# Patient Record
Sex: FEMALE | Race: WHITE | NOT HISPANIC OR LATINO | URBAN - METROPOLITAN AREA
[De-identification: names, ages, dates, MRNs, and addresses within clinical notes are randomized per-mention and may not be internally consistent; named-entity substitution may affect disease eponyms.]

---

## 2017-04-18 ENCOUNTER — INPATIENT (INPATIENT)
Facility: HOSPITAL | Age: 77
LOS: 1 days | Discharge: HOME HEALTH | DRG: 580 | End: 2017-04-20
Attending: STUDENT IN AN ORGANIZED HEALTH CARE EDUCATION/TRAINING PROGRAM | Admitting: STUDENT IN AN ORGANIZED HEALTH CARE EDUCATION/TRAINING PROGRAM
Payer: COMMERCIAL

## 2017-04-18 VITALS
RESPIRATION RATE: 18 BRPM | TEMPERATURE: 98 F | HEART RATE: 82 BPM | DIASTOLIC BLOOD PRESSURE: 66 MMHG | SYSTOLIC BLOOD PRESSURE: 119 MMHG | OXYGEN SATURATION: 96 %

## 2017-04-18 DIAGNOSIS — E87.1 HYPO-OSMOLALITY AND HYPONATREMIA: ICD-10-CM

## 2017-04-18 DIAGNOSIS — I10 ESSENTIAL (PRIMARY) HYPERTENSION: ICD-10-CM

## 2017-04-18 DIAGNOSIS — I25.10 ATHEROSCLEROTIC HEART DISEASE OF NATIVE CORONARY ARTERY WITHOUT ANGINA PECTORIS: ICD-10-CM

## 2017-04-18 DIAGNOSIS — D64.9 ANEMIA, UNSPECIFIED: ICD-10-CM

## 2017-04-18 DIAGNOSIS — Z90.49 ACQUIRED ABSENCE OF OTHER SPECIFIED PARTS OF DIGESTIVE TRACT: Chronic | ICD-10-CM

## 2017-04-18 DIAGNOSIS — Z90.710 ACQUIRED ABSENCE OF BOTH CERVIX AND UTERUS: Chronic | ICD-10-CM

## 2017-04-18 DIAGNOSIS — C80.1 MALIGNANT (PRIMARY) NEOPLASM, UNSPECIFIED: ICD-10-CM

## 2017-04-18 DIAGNOSIS — R63.8 OTHER SYMPTOMS AND SIGNS CONCERNING FOOD AND FLUID INTAKE: ICD-10-CM

## 2017-04-18 DIAGNOSIS — Z41.8 ENCOUNTER FOR OTHER PROCEDURES FOR PURPOSES OTHER THAN REMEDYING HEALTH STATE: ICD-10-CM

## 2017-04-18 LAB
ALBUMIN SERPL ELPH-MCNC: 2.7 G/DL — LOW (ref 3.4–5)
ALP SERPL-CCNC: 58 U/L — SIGNIFICANT CHANGE UP (ref 40–120)
ALT FLD-CCNC: 20 U/L — SIGNIFICANT CHANGE UP (ref 12–42)
ANION GAP SERPL CALC-SCNC: 6 MMOL/L — LOW (ref 9–16)
APTT BLD: 25.3 SEC — LOW (ref 27.5–37.4)
AST SERPL-CCNC: 52 U/L — HIGH (ref 15–37)
BASOPHILS NFR BLD AUTO: 0.4 % — SIGNIFICANT CHANGE UP (ref 0–2)
BILIRUB SERPL-MCNC: 0.4 MG/DL — SIGNIFICANT CHANGE UP (ref 0.2–1.2)
BLD GP AB SCN SERPL QL: NEGATIVE — SIGNIFICANT CHANGE UP
BUN SERPL-MCNC: 18 MG/DL — SIGNIFICANT CHANGE UP (ref 7–23)
CALCIUM SERPL-MCNC: 9 MG/DL — SIGNIFICANT CHANGE UP (ref 8.5–10.5)
CHLORIDE SERPL-SCNC: 105 MMOL/L — SIGNIFICANT CHANGE UP (ref 96–108)
CO2 SERPL-SCNC: 23 MMOL/L — SIGNIFICANT CHANGE UP (ref 22–31)
CREAT SERPL-MCNC: 0.66 MG/DL — SIGNIFICANT CHANGE UP (ref 0.5–1.3)
EOSINOPHIL NFR BLD AUTO: 0.7 % — SIGNIFICANT CHANGE UP (ref 0–6)
GLUCOSE SERPL-MCNC: 94 MG/DL — SIGNIFICANT CHANGE UP (ref 70–99)
HCT VFR BLD CALC: 29.5 % — LOW (ref 34.5–45)
HGB BLD-MCNC: 9 G/DL — LOW (ref 11.5–15.5)
INR BLD: 1.17 — HIGH (ref 0.88–1.16)
LYMPHOCYTES # BLD AUTO: 11.9 % — LOW (ref 13–44)
MCHC RBC-ENTMCNC: 23.3 PG — LOW (ref 27–34)
MCHC RBC-ENTMCNC: 30.5 G/DL — LOW (ref 32–36)
MCV RBC AUTO: 76.2 FL — LOW (ref 80–100)
MONOCYTES NFR BLD AUTO: 8.7 % — SIGNIFICANT CHANGE UP (ref 2–14)
NEUTROPHILS NFR BLD AUTO: 78.3 % — HIGH (ref 43–77)
PLATELET # BLD AUTO: 294 K/UL — SIGNIFICANT CHANGE UP (ref 150–400)
POTASSIUM SERPL-MCNC: 5.2 MMOL/L — SIGNIFICANT CHANGE UP (ref 3.5–5.3)
POTASSIUM SERPL-SCNC: 5.2 MMOL/L — SIGNIFICANT CHANGE UP (ref 3.5–5.3)
PROT SERPL-MCNC: 8 G/DL — SIGNIFICANT CHANGE UP (ref 6.4–8.2)
PROTHROM AB SERPL-ACNC: 13 SEC — HIGH (ref 9.8–12.7)
RBC # BLD: 3.87 M/UL — SIGNIFICANT CHANGE UP (ref 3.8–5.2)
RH IG SCN BLD-IMP: POSITIVE — SIGNIFICANT CHANGE UP
SODIUM SERPL-SCNC: 134 MMOL/L — LOW (ref 135–145)
WBC # BLD: 7.5 K/UL — SIGNIFICANT CHANGE UP (ref 3.8–10.5)
WBC # FLD AUTO: 7.5 K/UL — SIGNIFICANT CHANGE UP (ref 3.8–10.5)

## 2017-04-18 PROCEDURE — 93010 ELECTROCARDIOGRAM REPORT: CPT

## 2017-04-18 PROCEDURE — 99285 EMERGENCY DEPT VISIT HI MDM: CPT | Mod: 25

## 2017-04-18 PROCEDURE — 71010: CPT | Mod: 26

## 2017-04-18 PROCEDURE — 99223 1ST HOSP IP/OBS HIGH 75: CPT | Mod: GC

## 2017-04-18 RX ORDER — SIMVASTATIN 20 MG/1
20 TABLET, FILM COATED ORAL AT BEDTIME
Qty: 0 | Refills: 0 | Status: DISCONTINUED | OUTPATIENT
Start: 2017-04-18 | End: 2017-04-20

## 2017-04-18 RX ORDER — METOPROLOL TARTRATE 50 MG
25 TABLET ORAL DAILY
Qty: 0 | Refills: 0 | Status: DISCONTINUED | OUTPATIENT
Start: 2017-04-18 | End: 2017-04-20

## 2017-04-18 RX ORDER — HEPARIN SODIUM 5000 [USP'U]/ML
5000 INJECTION INTRAVENOUS; SUBCUTANEOUS EVERY 12 HOURS
Qty: 0 | Refills: 0 | Status: DISCONTINUED | OUTPATIENT
Start: 2017-04-18 | End: 2017-04-19

## 2017-04-18 RX ORDER — ANASTROZOLE 1 MG/1
1 TABLET ORAL DAILY
Qty: 0 | Refills: 0 | Status: DISCONTINUED | OUTPATIENT
Start: 2017-04-18 | End: 2017-04-20

## 2017-04-18 RX ORDER — TRAMADOL HYDROCHLORIDE 50 MG/1
0 TABLET ORAL
Qty: 0 | Refills: 0 | COMMUNITY

## 2017-04-18 RX ORDER — ASPIRIN/CALCIUM CARB/MAGNESIUM 324 MG
81 TABLET ORAL DAILY
Qty: 0 | Refills: 0 | Status: DISCONTINUED | OUTPATIENT
Start: 2017-04-18 | End: 2017-04-18

## 2017-04-18 RX ADMIN — SIMVASTATIN 20 MILLIGRAM(S): 20 TABLET, FILM COATED ORAL at 23:27

## 2017-04-18 NOTE — H&P ADULT - HISTORY OF PRESENT ILLNESS
77 yo F with a PMH of cervical cancer (what year, what treatment, what doctor, where is follow up) presents for treatment of Stage 4 left breast adenocarcinoma with metastatic spread to the bone. Pt is a pt of ______ and usually has her therapy done at ______, however the pt is here today because_______.     fungating mass surgery says nothing to do possibly debulking at a later time 75 yo F with a PMH of cervical cancer (1988, what Hysterectomy, [facility], without follow up), HTN, CAD, of Stage 4 left breast adenocarcinoma with metastatic spread to the bone presents with bleeding breast mass. The mass bleeds every day for the past 4 years. It has gotten progressivey worse with increased enhancement in the last three months. Today, the patient removed a dressing from the mass and the bleeding was significant the the patient described the bleeding as hemorrhaging". Pt is a pt of ______ and usually has her therapy done at ______, however the pt is here today because_______.     fungating mass surgery says nothing to do possibly debulking at a later time     Dr. Yanez Radiation Oncologist 77 yo F with a PMH of cervical cancer (1988, what Hysterectomy, [facility], without follow up), HTN, CAD, of Stage 4 left breast adenocarcinoma with metastatic spread to the bone presents with bleeding breast mass. The mass bleeds every day for the past 4 years. It has gotten progressivey worse with increased growth of mass in the last three months. Today, the patient removed a dressing from the mass and the bleeding was significant the the patient described the bleeding as hemorrhaging". Pt hs hard time quantifying it but staes bleeding is about a half a cut.  [facility] for 5 days with mets to the back but had insurance complications. Pt kept the cancer a secret from her family until recently due to other stressors in her life      Pt denies headache, chest pain, shorness of breath, cough, abdominal pain, N/V/D/, dysuria, hematuria, hematochezia, melena. Admits to constupation last BM 3 days ago  Was scheduled for radiosurgery evaluation this afternoon with Dr. Pablo, with plans to receive medi-port on Thursday  fungating mass surgery says nothing to do possibly debulking at a later time     Dr. Yanez Radiation Oncologist    Surg- Dr. De Santiago rec heme/onc looks as is eroding into chest wall surg is on call team 1 if starts bledding [id number] 75 yo F with a PMH of cervical cancer (1988, what Hysterectomy, [facility], without follow up), HTN, CAD, of Stage 4 left breast adenocarcinoma with metastatic spread to the bone presents with bleeding breast mass. The mass has bleed every day for the past few years, with penetration through the skin approximately 2 years ago. It has gotten progressively worse with increased growth of mass in the last three months. Today, the patient removed a dressing from the mass and the bleeding was significant. Patient described the bleeding as "hemorrhaging". Pt has hard time quantifying it but states bleeding is about a half a cup.      Pt was hospitalized at [facility] for 5 days for workup  with mets to the back but had insurance complications. Pt kept the cancer a secret from her family until recently due to other stressors in her life      Pt denies headache, chest pain, shorness of breath, cough, abdominal pain, N/V/D/, dysuria, hematuria, hematochezia, melena. Admits to constupation last BM 3 days ago  Was scheduled for radiosurgery evaluation this afternoon with Dr. Pablo, with plans to receive medi-port on Thursday  fungating mass surgery says nothing to do possibly debulking at a later time     Dr. Yanez Radiation Oncologist    Surg- Dr. De Santiago rec heme/onc looks as is eroding into chest wall surg is on call team 1 if starts bledding [phone] 75 yo F with a PMH of cervical cancer (1988, what Hysterectomy, [facility], without follow up), HTN, CAD, of Stage 4 left breast adenocarcinoma with metastatic spread to the bone presents with bleeding breast mass. The mass has bleed every day for the past few years, with penetration through the skin approximately 2 years ago. It has gotten progressively worse with increased growth of mass in the last three months. Today, the patient removed a dressing from the mass and the bleeding was significant. Patient described the bleeding as "hemorrhaging". Pt has hard time quantifying it but states bleeding is about a half a cup.      Pt was hospitalized at [facility] 25th for 5 days for workup  with mets to the back but had insurance complications. Pt kept the cancer a secret from her family until recently due to other stressors in her life. While at [facility], the patient's oncologist was Dr. Velazquez ([phone]). Unfortunately, [facility] is not in the pts insurance coverage and pt had to be referred outside of network. Pt saw Dr. Keys ([phone]) on Monday for an initial evaluation. Was scheduled for radiosurgery evaluation this afternoon with Dr. Garzon (Rad-Onc [phone]) , with plans to receive medi-port on Thursday, however plans were placed on hold as pt presented today.     Pt denies headache, chest pain, shortness of breath, cough, abdominal pain, N/V/D/, dysuria, hematuria, hematochezia, melena. Admits to constipation last BM 3 days ago    Was scheduled for radiosurgery evaluation this afternoon with Dr. Garzon (Rad-Onc [phone]), with plans to receive medi-port on Thursday, however plans were placed on hold as pt presented today.     Surgery was consulted but no acute intervention needed at this time as bleeding has subsided. 77 yo F with a PMH of cervical cancer (1988, what Hysterectomy, [facility], without follow up),  CAD, of Stage 4 left breast adenocarcinoma with metastatic spread to the bone presents with bleeding breast mass. The mass has bleed every day for the past few years, with penetration through the skin approximately 2 years ago. It has gotten progressively worse with increased growth of mass in the last three months. Today, the patient removed a dressing from the mass and the bleeding was significant. Patient described the bleeding as "hemorrhaging". Pt has hard time quantifying it but states bleeding is about a half a cup.       Pt was hospitalized at [facility] 25[facility] for 5 days for workup  with mets to the back but had insurance complications. Pt kept the cancer a secret from her family until recently due to other stressors in her life. While at [facility], the patient's oncologist was Dr. Velazquez ([phone]). Unfortunately, [facility] is not in the pts insurance coverage and pt had to be referred outside of network. Pt saw Dr. Keys ([phone]) on Monday for an initial evaluation. Was scheduled for radiosurgery evaluation this afternoon with Dr. Garzon (Rad-Onc [phone]) , with plans to receive medi-port on Thursday, however plans were placed on hold as pt presented today.     Pt denies headache, chest pain, shortness of breath, cough, abdominal pain, N/V/D/, dysuria, hematuria, hematochezia, melena. Admits to constipation last BM 3 days ago    Was scheduled for radiosurgery evaluation this afternoon with Dr. Garzon (Rad-Onc [phone]), with plans to receive medi-port on Thursday, however plans were placed on hold as pt presented today.     Surgery was consulted but no acute intervention needed at this time as bleeding has subsided.

## 2017-04-18 NOTE — ED ADULT NURSE NOTE - OBJECTIVE STATEMENT
76y F, A&ox3, came in via EMS after noting bleeding from left breast tumor. Noted dry blood to abdomen, left lower leg, and left hand. Bleeding controlled on arrival. Noted wound to left lower breast, minimal bleeding at the time. Pt states had to stop aspirin x5days ago for radiation evaluation treatment. Denies sob, lightheadedness, dizziness, no cp, no n/v. Denies fever nor chills. +pulses bilateral. Guaze on wound. PT states previously happened before but recalls more blood lost today, cannot recall how much blood. Will continue to monitor. 76y F, A&ox3, came in via EMS after noting bleeding from left breast tumor. Hx of left breast ca with metastasis to bone. Noted dry blood to abdomen, left lower leg, and left hand. Bleeding controlled on arrival. Noted ulcerative tumor to left lower breast, minimal bleeding at the time. Pt states had to stop aspirin x5days ago for radiation evaluation treatment. Denies sob, lightheadedness, dizziness, no cp, no n/v. Denies fever nor chills. +pulses bilateral. Guaze on wound. PT states previously happened before but recalls more blood lost today, cannot recall how much blood. Will continue to monitor.

## 2017-04-18 NOTE — ED PROVIDER NOTE - MEDICAL DECISION MAKING DETAILS
77yo female with metastatic adenocarcinoma of L breast with mets to spine, bone, lung? liver? presenting with persistently worsening bleeding of mass. Pt well appearing with nl VS H/H 9/29 took ASA 5 days prior. Surgery at bedside, no active bleeding, only oozing from mass. Will attempt to ligate/ cauterize bleeding area. Likely admit for obs vs further treatment. Daughter is requesting that pt be treated while inpatient.

## 2017-04-18 NOTE — ED PROVIDER NOTE - OBJECTIVE STATEMENT
75 yo female with h/o cervical ca presenting with L breast adenocarcinoma that is large and ulcerated began bleeding this morning, pt states that it was a "heavy bleed," that resolved prior to arrival in ED. Pt states that she is generally weak however denies lightheadedness, dizziness, syncope. Last took ASA 3 days ago. 75 yo female with h/o cervical ca presenting with L breast adenocarcinoma stage 4 with mets to bone- that is large and ulcerated began bleeding this morning, pt states that it was a "heavy bleed," that resolved prior to arrival in ED. Pt states that she is generally weak however denies lightheadedness, dizziness, syncope. Last took ASA 5 days ago.    Pt recently admitted to [facility], not in network for pt insurance, as per daughter would like to receive treatment here. Was scheduled for radiosurgery this afternoon with Dr. Pablo, is going to receive medi-port on Thursday. Does not currently have an oncologist. Was being seen by Dr. Judith Velazquez at [facility] [phone]. Has received approx 3 prior transfusions secondary to bleeding mass.

## 2017-04-18 NOTE — ED ADULT NURSE REASSESSMENT NOTE - NS ED NURSE REASSESS COMMENT FT1
Pt resting comfortably. Pending surgery consult. Labs reviewed. No active bleeding. Will continue to monitor.

## 2017-04-18 NOTE — H&P ADULT - PROBLEM SELECTOR PLAN 5
HSQ for now. Should be on lovenox as pt has malignancy however uncertain if pt will need procedure in near future. Consider switch to lovenox as plan becomes more concrete

## 2017-04-18 NOTE — H&P ADULT - ASSESSMENT
75 yo with PMH of cervical cancer, HTN, CAD,Stage 4 left breast adenocarcinoma with metastatic spread to the bone presents with bleeding breast mass. 77 yo with PMH of cervical cancer, CAD,Stage 4 left breast adenocarcinoma with metastatic spread to the bone presents with bleeding breast mass.

## 2017-04-18 NOTE — H&P ADULT - NSHPSOCIALHISTORY_GEN_ALL_CORE
Smoking-  ETHO-  Elicit-   Work-  Exposure-  Family life-   Sexual History- Smoking- smoked sparingly in 20s but could not quantify  ETHO- glass of wine sparingly   Elicit- none   Work- retired    Exposure- none   Family life- lives in [place] building on [address] floor by herself  Ambulation- without assistance   Sexual History- not sexually active  no hx of STD

## 2017-04-18 NOTE — CONSULT NOTE ADULT - ASSESSMENT
77 yo female with metastatic breast CA with bleeding fungating mass (now bleeding stopped)    [facility]  Dr. De Santiago to evaluate mass and imaging tomorrow to evaluate resectability  If no head CT please confirm no brain mets  Surgery team available if begins to rebleed contact [phone]  Discussed with Dr. De Santiago 75 yo female with metastatic breast CA with bleeding fungating left breat mass (now bleeding stopped), possible right breast mass. She has bulky lymphadenopathy on the left and suspicion for extensive spinal mets/epidural soft tissue involvement and pulmonary disease, possible other areas of bony disease, and other areas of metastatic disease.    Plan:  Oncology input for systemic therapy. port placement done today.  we will review PET CT as well.  Radiation consultation for possible spine radiation  Continue pads to wound for mild oozing. Hbg 9. We will review films, if this is not extending into bone (it looks like it may not be), as it feels mobile, once the spine is treated, we can consider excision/toilet mastectomy for wound control after any more urgent needs are taken care of. We would have plastic surgery involved as needed for wound coverage and possible thoracic in case of any surprises with chest wall/bone involvement particularly in 8-9-10th intercostal spaces where there is likely muscle involvement. F/u outpatient with me asap.

## 2017-04-18 NOTE — ED PROVIDER NOTE - CARE PLAN
Principal Discharge DX:	Breast mass Principal Discharge DX:	Breast mass  Secondary Diagnosis:	Anemia, unspecified type

## 2017-04-18 NOTE — H&P ADULT - PROBLEM SELECTOR PLAN 4
FEN  No IVF  replete lytes prn   Cardiac Diet mentating well. unknown baseline. could be SIADH in setting of malignancy.   -Will sent serum osm and urine sodium for workup.

## 2017-04-18 NOTE — H&P ADULT - PROBLEM SELECTOR PLAN 1
[facility] consult to evaluate prognoses  Dr. De Santiago to evaluate mass and imaging tomorrow to evaluate resectability  If no head CT please confirm no brain mets  Surgery team available if begins to rebleed contact [phone]  Discussed with Dr. De Santiago  - pt on anastrozole Pt with large exophytic mass on left breast as described in PE. Pt treated by Dr. Velazquez at [facility] as inpatient, had initial evaluation with Dr. Keys on Monday, placed on anastrozole by Dr. Keys. Plans to see Dr. De La Torre today and chemiport placement on thursady, however pt presents to [facility] with bleeding. Bleeding now stable.   -Surgery consulted nothing acute to do at this time. Dr. De Santiago to evaluate mass and imaging tomorrow to evaluate resectability. They would like a head CT if one has not been done yet to investigate mets. Pt has imagine with her that is left in the chart that must be taken down to radiology for upload into PACS. Surgery team available if begins to rebleed and requires immediate intervention [phone]  - c/w anastrozole  - obtain collateral from Dr. Keys/ Dr. Velazquez (contact info in HPI) Pt with large exophytic mass on left breast as described in PE. Pt treated by Dr. Velazquez at [facility] as inpatient, had initial evaluation with Dr. Keys on Monday, placed on anastrozole by Dr. Keys. Plans to see Dr. De La Torre today and chemiport placement on thursady, however pt presents to [facility] with bleeding. Bleeding now stable.   -Surgery consulted nothing acute to do at this time. Dr. De Santiago to evaluate mass and imaging tomorrow to evaluate resectability. They would like a head CT if one has not been done yet to investigate mets. Pt has imagine with her that is left in the chart that must be taken down to radiology for upload into PACS. Surgery team available if begins to rebleed and requires immediate intervention [phone]  - c/w anastrozole  - obtain collateral from Dr. Keys/ Dr. Velazquez (contact info in HPI)  - wound care consult placed

## 2017-04-18 NOTE — H&P ADULT - NSHPPHYSICALEXAM_GEN_ALL_CORE
PHYSICAL EXAM:    Constitutional: WDWN, NAD,   Eyes: PERRL, EOMI, sclera non-icteric  Neck: supple, no masses, no JVD  Respiratory: CTA b/l, good air entry b/l, no wheezing, rhonchi, rales, with normal respiratory effort and no intercostal retractions  Cardiovascular: RRR, normal S1S2, no M/R/G  Breast:  Gastrointestinal: soft, NTND, no masses palpable, BS normal in all four quadrants, no HSM  Extremities: WWM, no c/c/e  Neurological: AAOx3  Skin: Normal temperature PHYSICAL EXAM:    Constitutional: WDWN, NAD,   Eyes: PERRL, EOMI, sclera non-icteric  Neck: supple, no masses, no JVD  Respiratory: CTA b/l, good air entry b/l, no wheezing, rhonchi, rales, with normal respiratory effort and no intercostal retractions  Cardiovascular: RRR, normal S1S2, no M/R/G  Breast:  Left breast mass at 5 o ' clock [id number] fungating mass with contiguous granulation tissue throughout, active bleeding/wheezing appreciated with soiled dressing, firm base with reddish color, potent foul odor   Gastrointestinal: soft, NTND, no masses palpable, BS normal in all four quadrants, no HSM  Extremities: WWM, no c/c/e  Neurological: AAOx3  Skin: Normal temperature PHYSICAL EXAM:    Constitutional: WDWN, NAD,   Eyes: PERRL, EOMI, sclera non-icteric  Neck: supple, no masses, no JVD  Respiratory: CTA b/l, good air entry b/l, no wheezing, rhonchi, rales, with normal respiratory effort and no intercostal retractions  Cardiovascular: RRR, normal S1S2, no M/R/G  Breast:  Left breast mass at 5 o ' clock [id number] fungating mass with contiguous granulation tissue throughout, active bleeding/wheezing appreciated with soiled dressing, firm base with reddish color, potent foul odor, exophytic   Gastrointestinal: soft, NTND, no masses palpable, BS normal in all four quadrants, no HSM  Extremities: WWM, no c/c/e  Neurological: AAOx3  Skin: Normal temperature

## 2017-04-18 NOTE — H&P ADULT - PROBLEM SELECTOR PLAN 2
as per patient, started while at [facility] for suspected old infarct  -c/w statin, asa, beta blocker

## 2017-04-18 NOTE — H&P ADULT - PROBLEM SELECTOR PLAN 3
HSQ likely secondary to persistent slow ooz from lesion site as well as anemia of chronic disease. hemoglobin 9 currently unknown baseline.   -obtain collateral for baseline hbg in am   -f/u cbc likely secondary to persistent slow ooz from lesion site as well as anemia of chronic disease. hemoglobin 9 currently unknown baseline. Pt HD stable.   -obtain collateral for baseline hbg in am   -f/u cbc

## 2017-04-18 NOTE — ED ADULT TRIAGE NOTE - CHIEF COMPLAINT QUOTE
Patient brought in by EMS for evaluation of bleeding from left breast tumor. Says stopped anticoagulants 5 days ago to prep for a procedure. Bleeding now controlled with pressure.

## 2017-04-18 NOTE — ED PROVIDER NOTE - SKIN LOCATION #1
large 4cm x 4cm ulcerated mass of L breast with exposed granulation tissue with mild active oozing. No identifiable bleeding vessel, foul smell

## 2017-04-19 ENCOUNTER — TRANSCRIPTION ENCOUNTER (OUTPATIENT)
Age: 77
End: 2017-04-19

## 2017-04-19 LAB
ANION GAP SERPL CALC-SCNC: 8 MMOL/L — LOW (ref 9–16)
BUN SERPL-MCNC: 15 MG/DL — SIGNIFICANT CHANGE UP (ref 7–23)
CALCIUM SERPL-MCNC: 9.2 MG/DL — SIGNIFICANT CHANGE UP (ref 8.5–10.5)
CHLORIDE SERPL-SCNC: 105 MMOL/L — SIGNIFICANT CHANGE UP (ref 96–108)
CO2 SERPL-SCNC: 25 MMOL/L — SIGNIFICANT CHANGE UP (ref 22–31)
CREAT SERPL-MCNC: 0.65 MG/DL — SIGNIFICANT CHANGE UP (ref 0.5–1.3)
GLUCOSE SERPL-MCNC: 93 MG/DL — SIGNIFICANT CHANGE UP (ref 70–99)
HCT VFR BLD CALC: 29.4 % — LOW (ref 34.5–45)
HGB BLD-MCNC: 9 G/DL — LOW (ref 11.5–15.5)
MAGNESIUM SERPL-MCNC: 2.1 MG/DL — SIGNIFICANT CHANGE UP (ref 1.6–2.4)
MCHC RBC-ENTMCNC: 23.3 PG — LOW (ref 27–34)
MCHC RBC-ENTMCNC: 30.6 G/DL — LOW (ref 32–36)
MCV RBC AUTO: 76.2 FL — LOW (ref 80–100)
OSMOLALITY SERPL: 290 MOSM/KG — SIGNIFICANT CHANGE UP (ref 280–301)
PHOSPHATE SERPL-MCNC: 3.9 MG/DL — SIGNIFICANT CHANGE UP (ref 2.5–4.5)
PLATELET # BLD AUTO: 310 K/UL — SIGNIFICANT CHANGE UP (ref 150–400)
POTASSIUM SERPL-MCNC: 4.1 MMOL/L — SIGNIFICANT CHANGE UP (ref 3.5–5.3)
POTASSIUM SERPL-SCNC: 4.1 MMOL/L — SIGNIFICANT CHANGE UP (ref 3.5–5.3)
RBC # BLD: 3.86 M/UL — SIGNIFICANT CHANGE UP (ref 3.8–5.2)
SODIUM SERPL-SCNC: 138 MMOL/L — SIGNIFICANT CHANGE UP (ref 135–145)
SODIUM UR-SCNC: 100 MMOL/L — SIGNIFICANT CHANGE UP
WBC # BLD: 5.3 K/UL — SIGNIFICANT CHANGE UP (ref 3.8–10.5)
WBC # FLD AUTO: 5.3 K/UL — SIGNIFICANT CHANGE UP (ref 3.8–10.5)

## 2017-04-19 PROCEDURE — 99222 1ST HOSP IP/OBS MODERATE 55: CPT

## 2017-04-19 PROCEDURE — 76937 US GUIDE VASCULAR ACCESS: CPT | Mod: 26

## 2017-04-19 PROCEDURE — 77001 FLUOROGUIDE FOR VEIN DEVICE: CPT | Mod: 26

## 2017-04-19 PROCEDURE — 99233 SBSQ HOSP IP/OBS HIGH 50: CPT | Mod: GC

## 2017-04-19 PROCEDURE — 36561 INSERT TUNNELED CV CATH: CPT

## 2017-04-19 RX ORDER — ACETAMINOPHEN 500 MG
650 TABLET ORAL EVERY 6 HOURS
Qty: 0 | Refills: 0 | Status: DISCONTINUED | OUTPATIENT
Start: 2017-04-19 | End: 2017-04-20

## 2017-04-19 RX ORDER — HEPARIN SODIUM 5000 [USP'U]/ML
5000 INJECTION INTRAVENOUS; SUBCUTANEOUS EVERY 8 HOURS
Qty: 0 | Refills: 0 | Status: DISCONTINUED | OUTPATIENT
Start: 2017-04-19 | End: 2017-04-19

## 2017-04-19 RX ORDER — ENOXAPARIN SODIUM 100 MG/ML
40 INJECTION SUBCUTANEOUS EVERY 24 HOURS
Qty: 0 | Refills: 0 | Status: DISCONTINUED | OUTPATIENT
Start: 2017-04-19 | End: 2017-04-20

## 2017-04-19 RX ADMIN — HEPARIN SODIUM 5000 UNIT(S): 5000 INJECTION INTRAVENOUS; SUBCUTANEOUS at 06:18

## 2017-04-19 RX ADMIN — Medication 25 MILLIGRAM(S): at 06:18

## 2017-04-19 RX ADMIN — Medication 650 MILLIGRAM(S): at 23:19

## 2017-04-19 RX ADMIN — SIMVASTATIN 20 MILLIGRAM(S): 20 TABLET, FILM COATED ORAL at 22:49

## 2017-04-19 RX ADMIN — ANASTROZOLE 1 MILLIGRAM(S): 1 TABLET ORAL at 12:36

## 2017-04-19 RX ADMIN — ENOXAPARIN SODIUM 40 MILLIGRAM(S): 100 INJECTION SUBCUTANEOUS at 19:46

## 2017-04-19 NOTE — DISCHARGE NOTE ADULT - PROVIDER TOKENS
FREE:[LAST:[sim],PHONE:[(   )    -],FAX:[(   )    -],ADDRESS:[Dr. Ramsey Keys MD  Oncologist  Counts include 234 beds at the [facility]  [phone]]],TOKEN:'[id number]',TOKEN:'[id number]'

## 2017-04-19 NOTE — DISCHARGE NOTE ADULT - NS AS ACTIVITY OBS
Bathing allowed/Walking-Indoors allowed/Stairs allowed/Sex allowed/Walking-Outdoors allowed/Please continue to increase your activity as tolerated/Showering allowed

## 2017-04-19 NOTE — PROGRESS NOTE ADULT - SUBJECTIVE AND OBJECTIVE BOX
SUBJECTIVE: Pt seen and examined at bedside. No overnight events. No f/c/n/v    Vital Signs Last 24 Hrs  T(C): 36.6, Max: 36.8 (04-18 @ 12:29)  T(F): 97.9, Max: 98.3 (04-18 @ 12:29)  HR: 78 (78 - 88)  BP: 162/79 (119/66 - 162/79)  BP(mean): --  RR: 16 (16 - 18)  SpO2: 97% (95% - 98%)    PHYSICAL EXAM    Gen: NAD  CV RRR  Pulm: Regular non-labored respirations  Breast: Left breast with bulky mass with necrotic surface, nontender, no erythema or obvious purulence but fibrinous/necrotic tissue throughout. Left axillary lymphadenopathy noted. Right breast soft, no obvious mass, nontender  Abd: Soft, nontender, nondistended  Ext: P        LABS:                        9.0    5.3   )-----------( 310      ( 19 Apr 2017 08:04 )             29.4     04-19    138  |  105  |  15  ----------------------------<  93  4.1   |  25  |  0.65    Ca    9.2      19 Apr 2017 08:04  Phos  3.9     04-19  Mg     2.1     04-19    TPro  8.0  /  Alb  2.7<L>  /  TBili  0.4  /  DBili  x   /  AST  52<H>  /  ALT  20  /  AlkPhos  58  04-18    PT/INR - ( 18 Apr 2017 13:12 )   PT: 13.0 sec;   INR: 1.17          PTT - ( 18 Apr 2017 13:12 )  PTT:25.3 sec      ASSESSMENT AND PLAN  75 yo female with metastatic breast CA with bleeding fungating mass. Bleeding has since stopped, pt H/H stable. AFVSS  -Will f/u Heme-Onc consult  -Will need images from [facility] uploaded to synapse to assess  -Dr. De Santiago to evaluate mass and imaging to evaluate resectability  -D/W Dr. De Santiago

## 2017-04-19 NOTE — DISCHARGE NOTE ADULT - HOSPITAL COURSE
Patient is a 75 yo F with a PMH of cervical cancer (1988, what Hysterectomy, [facility], without follow up),  CAD, of Stage 4 left breast adenocarcinoma with metastatic spread to the bone presents with bleeding breast mass. The mass has bleed every day for the past few years, with penetration through the skin approximately 2 years ago. It has gotten progressively worse with increased growth of mass in the last three months. Patient presented with bleeding from the mass on 4/18     Pt was hospitalized at [address] for 5 days for workup  with mets to the back but had insurance complications. Pt kept the cancer a secret from her family until recently due to other stressors in her life. While at [facility], the patient's oncologist was Dr. Velazquez ([phone]). Unfortunately, [facility] is not in the pts insurance coverage and pt had to be referred outside of network. Pt saw Dr. Keys ([phone]) on Monday for an initial evaluation. Was scheduled for radiosurgery evaluation before presentation with Dr. Garzon (Rad-Onc [phone]) , with plans to receive medi-port on Thursday, however plans were placed on hold as pt presented prior.     Surgery consulted, no intervention at this time. Chemiport placed on 4/19 for chemotherapy with Dr. Keys. Patient seen by Dr. Frias on behalf of Dr. Garzon, and received radiation therapy to spine on 4/20. Patient will be discharged with close follow up by Dr. Keys, Dr Frias, and Surgery with Dr. De Santiago.

## 2017-04-19 NOTE — PROGRESS NOTE ADULT - ASSESSMENT
Patient is a 77 yo with PMH of cervical cancer, CAD, Stage 4 left breast adenocarcinoma with metastatic spread to the bone presents with bleeding breast mass, s/p chemoport placement now to be evaluated by radiation therapy

## 2017-04-19 NOTE — ADVANCED PRACTICE NURSE CONSULT - ASSESSMENT
History of Present Illness: 	  77 yo F with a PMH of cervical cancer (1988[facility]),  CAD, of Stage 4 left breast adenocarcinoma with metastatic spread to the bone presents with bleeding breast mass. Minimal bleeding noted at present but RN reports changing dressing twice today. Mass below left breast with minimal odor, pt denies pain, scattered necrotic tissue noted over mass.

## 2017-04-19 NOTE — ADVANCED PRACTICE NURSE CONSULT - RECOMMEDATIONS
Recommend silicone dressing over mass, leave in place x 5 days. Apply Aquacel Extra over silicone dressing, cover with ABD pads and secure with Spandage dressing. Spoke with ZANDRA Puga and house staff.

## 2017-04-19 NOTE — DISCHARGE NOTE ADULT - ADDITIONAL INSTRUCTIONS
Please follow up with Dr. Keys on April 26th at 11am  Please follow up with Dr. Nemesio Yanez on April 21st at 12pm Please follow up with Dr. Keys on April 26th at 11am  Please follow up with Dr. Nemesio Yanez on April 21st at 12pm  Please follow up with Dr. De Santiago April 25th at 11:30

## 2017-04-19 NOTE — DISCHARGE NOTE ADULT - PATIENT PORTAL LINK FT
“You can access the [facility] Patient Portal, offered by [facility], by registering with the following website: [email or web address]”

## 2017-04-19 NOTE — PROGRESS NOTE ADULT - PROBLEM SELECTOR PLAN 1
Pt with large exophytic mass on left breast as described in PE. Pt treated by Dr. Velazquez at [facility] as inpatient, had initial evaluation with Dr. Keys on Monday, placed on anastrozole by Dr. Keys. Plans to see Dr. De La Torre called will evaluate patient while inpatient.  -Surgery consulted nothing acute to do at this time. Dr. De Santiago following, recs appreciated. Surgery team available if begins to rebleed and requires immediate intervention [phone]  - c/w anastrozole  - Dr. Keys agrees to inpatient eval by Dr. Yanez, if no acute radiation planned will d/c with plans to start chemotherapy imminently with Dr. Keys  - wound care consulted, recommendations ordered Pt with large exophytic mass on left breast as described in PE. Pt treated by Dr. Velazquez at [facility] as inpatient, had initial evaluation with Dr. Keys on Monday, placed on anastrozole by Dr. Keys and referrals made to  rad onc ( Dr. De La Torre)  --he  will evaluate patient   port placed today as Dr Keys is planning systemic chemo in the near future  -Surgery consulted nothing acute to do at this time. Dr. De Santiago following, recs appreciated. Surgery team available if begins to rebleed and requires immediate intervention [phone]  - c/w anastrozole  -  eval by Dr. Yanez, if no acute radiation planned will d/c with plans to start chemotherapy imminently with Dr. Keys  - wound care consulted, recommendations ordered

## 2017-04-19 NOTE — DISCHARGE NOTE ADULT - CARE PROVIDERS DIRECT ADDRESSES
,DirectAddress_Unknown,[email or web address].,DirectAddress_Unknown

## 2017-04-19 NOTE — DISCHARGE NOTE ADULT - PLAN OF CARE
You came in because of bleeding of the Left breast cancer mass. You were seen by wound care and surgery, who will follow up as below. Please continue with wound care nursing recommendations:   Clean L breast mass with wound cleanser, lilliam arriaza. Keep silicone dressing in place x 5 days (ends 4/24), clean over silicone. Apply aquacel extra on top of silicone dressing, cover with abdominal pads. Spandage to hold dressing in place  You got radiation inpatient with Dr. Frias, who will continue to follow. Please call to make an appointment  Please follow up with Dr. Keys as below. Please continue on your previously prescribed medications as above You have anemia that was stable during your hospitalization, due to cancer and blood loss  For your physician, your baseline Hemoglobin while hospitalized was about 9

## 2017-04-19 NOTE — PROGRESS NOTE ADULT - PROBLEM SELECTOR PLAN 2
as per patient, started while at [facility] for suspected old infarct  -c/w statin, asa, beta blocker

## 2017-04-19 NOTE — DISCHARGE NOTE ADULT - CARE PLAN
Principal Discharge DX:	Breast cancer metastasized to multiple sites, left  Goal:	You came in because of bleeding of the Left breast cancer mass. You were seen by wound care and surgery, who will follow up as below.  Instructions for follow-up, activity and diet:	Please continue with wound care nursing recommendations:   Clean L breast mass with wound cleanser, lilliam arriaza. Keep silicone dressing in place x 5 days (ends 4/24), clean over silicone. Apply aquacel extra on top of silicone dressing, cover with abdominal pads. Spandage to hold dressing in place  You got radiation inpatient with Dr. Frias, who will continue to follow. Please call to make an appointment  Please follow up with Dr. Keys as below.  Secondary Diagnosis:	CAD (coronary artery disease)  Instructions for follow-up, activity and diet:	Please continue on your previously prescribed medications as above  Secondary Diagnosis:	Anemia  Instructions for follow-up, activity and diet:	You have anemia that was stable during your hospitalization, due to cancer and blood loss  For your physician, your baseline Hemoglobin while hospitalized was about 9

## 2017-04-19 NOTE — CONSULT NOTE ADULT - ASSESSMENT
75 yo female with Stage IV breast cancer with symptomatic thoracic spine mass with epidural disease and cord compression as well as fungating breast mass causing painless bleeding.     1) I will transition her care to [facility] and recommend radiation treatment to the thoracic spine.  I will her scan, but believe her disease is greatest at T6-T7.  I will recommend single fraction of RT (800 X 1) to provide local control and to allow initiation of systemic therapy.    2) Regarding breast mass, I will review with Dr. De Santiago.  Plan should be either simple mastectomy vs initiation of chemotherapy.  I would not recommend local radiation treatment to the breast primary at this time.

## 2017-04-19 NOTE — PROGRESS NOTE ADULT - PROBLEM SELECTOR PLAN 3
likely secondary to persistent slow ooze from lesion site as well as anemia of chronic disease. hemoglobin 9 currently unknown baseline. Pt HD stable.   -f/u cbc

## 2017-04-19 NOTE — DISCHARGE NOTE ADULT - NSFTFSERV1RD_GEN_ALL_CORE
central venous access care/wound care and assessment wound care and assessment/observation and assessment/central venous access care/teaching and training/other:

## 2017-04-19 NOTE — PROGRESS NOTE ADULT - SUBJECTIVE AND OBJECTIVE BOX
INTERVAL HPI/OVERNIGHT EVENTS:  Patient was seen and examined at bedside. As per nurse and patient, no o/n events, patient resting comfortably. No complaints at this time. Patient denies: fever, chills, dizziness, weakness, HA, Changes in vision, CP, palpitations, SOB, cough, N/V/D/C, dysuria, changes in bowel movements, LE edema. ROS otherwise negative.    VITAL SIGNS:  T(F): 98.4  HR: 68  BP: 125/73  RR: 14  SpO2: 96%  Wt(kg): --    PHYSICAL EXAM:    Constitutional: WDWN, NAD  HEENT: PERRL, EOMI, sclera non-icteric, neck supple, trachea midline, no masses, no JVD, MMM, good dentition  Respiratory: CTA b/l, good air entry b/l, no wheezing, no rhonchi, no rales, without accessory muscle use and no intercostal retractions  Cardiovascular: RRR, normal S1S2, no M/R/G  Gastrointestinal: soft, NTND, no masses palpable, BS normal  Extremities: Warm, well perfused, pulses equal bilateral upper and lower extremities, no edema, no clubbing  Breast: Left breast mass at 5 o ' clock 1inx3bl fungating mass with contiguous granulation tissue throughout, mild oozing appreciated with soiled dressing, firm base with reddish color, potent odor, exophytic   Neurological: AAOx3, CN Grossly intact  Skin: Normal temperature, warm, dry    MEDICATIONS  (STANDING):  heparin  Injectable 5000Unit(s) SubCutaneous every 12 hours  simvastatin 20milliGRAM(s) Oral at bedtime  anastrozole 1milliGRAM(s) Oral daily  metoprolol succinate ER 25milliGRAM(s) Oral daily    MEDICATIONS  (PRN):      Allergies    No Known Allergies    Intolerances        LABS:                        9.0    5.3   )-----------( 310      ( 19 Apr 2017 08:04 )             29.4     04-19    138  |  105  |  15  ----------------------------<  93  4.1   |  25  |  0.65    Ca    9.2      19 Apr 2017 08:04  Phos  3.9     04-19  Mg     2.1     04-19    TPro  8.0  /  Alb  2.7<L>  /  TBili  0.4  /  DBili  x   /  AST  52<H>  /  ALT  20  /  AlkPhos  58  04-18    PT/INR - ( 18 Apr 2017 13:12 )   PT: 13.0 sec;   INR: 1.17          PTT - ( 18 Apr 2017 13:12 )  PTT:25.3 sec      RADIOLOGY & ADDITIONAL TESTS:  Reviewed INTERVAL HPI/OVERNIGHT EVENTS:  Patient was seen and examined at bedside. As per nurse and patient, no o/n events, patient resting comfortably. still w/ mild oozing of blood from left breast mass. complains of mid to upper back pain (chronic). no motor weakness /sensory change/incontinence    ROS  Patient denies: fever, chills, dizziness, weakness, HA, Changes in vision, CP, palpitations, SOB, cough, N/V/D/C, dysuria, changes in bowel movements, LE edema. ROS otherwise negative.    VITAL SIGNS:  T(F): 98.4  HR: 68  BP: 125/73  RR: 14  SpO2: 96%  Wt(kg): --    PHYSICAL EXAM:    Constitutional: WDWN, NAD  HEENT: PERRL, EOMI, sclera non-icteric, neck supple, trachea midline, no masses, no JVD, MMM, good dentition  Respiratory: CTA b/l, good air entry b/l, no wheezing, no rhonchi, no rales, without accessory muscle use and no intercostal retractions  Cardiovascular: RRR, normal S1S2, no M/R/G  Gastrointestinal: soft, NTND, no masses palpable, BS normal  Extremities: Warm, well perfused, pulses equal bilateral upper and lower extremities, no edema, no clubbing  Breast: Left breast mass at 5 o ' clock [id number] large fungating mass with contiguous granulation tissue throughout, mild oozing appreciated with soiled dressing, firm base with reddish color, potent odor, exophytic   Neurological: AAOx3, CN ii-xii Grossly intact, 5/5 str all 4 ext, sensation intact (feet and hands) bilaterally, patellar /biceps reflexes 2+ bilaterally.   Skin: Normal temperature, warm, dry    MEDICATIONS  (STANDING):  heparin  Injectable 5000Unit(s) SubCutaneous every 12 hours  simvastatin 20milliGRAM(s) Oral at bedtime  anastrozole 1milliGRAM(s) Oral daily  metoprolol succinate ER 25milliGRAM(s) Oral daily    MEDICATIONS  (PRN):      Allergies    No Known Allergies    Intolerances        LABS:                        9.0    5.3   )-----------( 310      ( 19 Apr 2017 08:04 )             29.4     04-19    138  |  105  |  15  ----------------------------<  93  4.1   |  25  |  0.65    Ca    9.2      19 Apr 2017 08:04  Phos  3.9     04-19  Mg     2.1     04-19    TPro  8.0  /  Alb  2.7<L>  /  TBili  0.4  /  DBili  x   /  AST  52<H>  /  ALT  20  /  AlkPhos  58  04-18    PT/INR - ( 18 Apr 2017 13:12 )   PT: 13.0 sec;   INR: 1.17          PTT - ( 18 Apr 2017 13:12 )  PTT:25.3 sec      RADIOLOGY & ADDITIONAL TESTS:  Reviewed

## 2017-04-19 NOTE — DISCHARGE NOTE ADULT - CARE PROVIDER_API CALL
Dr. Ramsey montemayor MD  Oncologist  [address] [facility]  [phone]  Phone: (   )    -  Fax: (   )    -    Herbert Frias), Radiation Oncology  [address] [address]  Phone: [phone]  Fax: [phone]    Sophia De Santiago), Surgery; Surgical Critical Care  [address] [address]  Phone: [phone]  Fax: [phone]

## 2017-04-20 VITALS
OXYGEN SATURATION: 98 % | RESPIRATION RATE: 17 BRPM | DIASTOLIC BLOOD PRESSURE: 97 MMHG | TEMPERATURE: 98 F | HEART RATE: 81 BPM | SYSTOLIC BLOOD PRESSURE: 161 MMHG

## 2017-04-20 DIAGNOSIS — C79.51 SECONDARY MALIGNANT NEOPLASM OF BONE: ICD-10-CM

## 2017-04-20 DIAGNOSIS — C50.912 MALIGNANT NEOPLASM OF UNSPECIFIED SITE OF LEFT FEMALE BREAST: ICD-10-CM

## 2017-04-20 PROBLEM — Z85.41 PERSONAL HISTORY OF MALIGNANT NEOPLASM OF CERVIX UTERI: Chronic | Status: ACTIVE | Noted: 2017-04-18

## 2017-04-20 PROBLEM — Z00.00 ENCOUNTER FOR PREVENTIVE HEALTH EXAMINATION: Status: ACTIVE | Noted: 2017-04-20

## 2017-04-20 PROBLEM — I10 ESSENTIAL (PRIMARY) HYPERTENSION: Chronic | Status: ACTIVE | Noted: 2017-04-18

## 2017-04-20 PROBLEM — C50.919 MALIGNANT NEOPLASM OF UNSPECIFIED SITE OF UNSPECIFIED FEMALE BREAST: Chronic | Status: ACTIVE | Noted: 2017-04-18

## 2017-04-20 LAB
ALBUMIN SERPL ELPH-MCNC: 2.6 G/DL — LOW (ref 3.4–5)
ALP SERPL-CCNC: 55 U/L — SIGNIFICANT CHANGE UP (ref 40–120)
ALT FLD-CCNC: 15 U/L — SIGNIFICANT CHANGE UP (ref 12–42)
ANION GAP SERPL CALC-SCNC: 7 MMOL/L — LOW (ref 9–16)
AST SERPL-CCNC: 12 U/L — LOW (ref 15–37)
BASOPHILS NFR BLD AUTO: 0.5 % — SIGNIFICANT CHANGE UP (ref 0–2)
BILIRUB SERPL-MCNC: 0.4 MG/DL — SIGNIFICANT CHANGE UP (ref 0.2–1.2)
BUN SERPL-MCNC: 14 MG/DL — SIGNIFICANT CHANGE UP (ref 7–23)
CALCIUM SERPL-MCNC: 8.8 MG/DL — SIGNIFICANT CHANGE UP (ref 8.5–10.5)
CHLORIDE SERPL-SCNC: 106 MMOL/L — SIGNIFICANT CHANGE UP (ref 96–108)
CO2 SERPL-SCNC: 25 MMOL/L — SIGNIFICANT CHANGE UP (ref 22–31)
CREAT SERPL-MCNC: 0.74 MG/DL — SIGNIFICANT CHANGE UP (ref 0.5–1.3)
EOSINOPHIL NFR BLD AUTO: 1.6 % — SIGNIFICANT CHANGE UP (ref 0–6)
GLUCOSE SERPL-MCNC: 100 MG/DL — HIGH (ref 70–99)
HCT VFR BLD CALC: 28.8 % — LOW (ref 34.5–45)
HGB BLD-MCNC: 8.8 G/DL — LOW (ref 11.5–15.5)
LYMPHOCYTES # BLD AUTO: 17.7 % — SIGNIFICANT CHANGE UP (ref 13–44)
MAGNESIUM SERPL-MCNC: 2.2 MG/DL — SIGNIFICANT CHANGE UP (ref 1.6–2.4)
MCHC RBC-ENTMCNC: 23.4 PG — LOW (ref 27–34)
MCHC RBC-ENTMCNC: 30.6 G/DL — LOW (ref 32–36)
MCV RBC AUTO: 76.6 FL — LOW (ref 80–100)
MONOCYTES NFR BLD AUTO: 8.9 % — SIGNIFICANT CHANGE UP (ref 2–14)
NEUTROPHILS NFR BLD AUTO: 71.3 % — SIGNIFICANT CHANGE UP (ref 43–77)
PHOSPHATE SERPL-MCNC: 3.7 MG/DL — SIGNIFICANT CHANGE UP (ref 2.5–4.5)
PLATELET # BLD AUTO: 340 K/UL — SIGNIFICANT CHANGE UP (ref 150–400)
POTASSIUM SERPL-MCNC: 4.3 MMOL/L — SIGNIFICANT CHANGE UP (ref 3.5–5.3)
POTASSIUM SERPL-SCNC: 4.3 MMOL/L — SIGNIFICANT CHANGE UP (ref 3.5–5.3)
PROT SERPL-MCNC: 7.5 G/DL — SIGNIFICANT CHANGE UP (ref 6.4–8.2)
RBC # BLD: 3.76 M/UL — LOW (ref 3.8–5.2)
SODIUM SERPL-SCNC: 138 MMOL/L — SIGNIFICANT CHANGE UP (ref 135–145)
WBC # BLD: 5.8 K/UL — SIGNIFICANT CHANGE UP (ref 3.8–10.5)
WBC # FLD AUTO: 5.8 K/UL — SIGNIFICANT CHANGE UP (ref 3.8–10.5)

## 2017-04-20 PROCEDURE — 77001 FLUOROGUIDE FOR VEIN DEVICE: CPT

## 2017-04-20 PROCEDURE — G6002: CPT | Mod: 26

## 2017-04-20 PROCEDURE — 77332 RADIATION TREATMENT AID(S): CPT | Mod: 26,59

## 2017-04-20 PROCEDURE — 71045 X-RAY EXAM CHEST 1 VIEW: CPT

## 2017-04-20 PROCEDURE — 77263 THER RADIOLOGY TX PLNG CPLX: CPT

## 2017-04-20 PROCEDURE — 36415 COLL VENOUS BLD VENIPUNCTURE: CPT

## 2017-04-20 PROCEDURE — 85027 COMPLETE CBC AUTOMATED: CPT

## 2017-04-20 PROCEDURE — 77295 3-D RADIOTHERAPY PLAN: CPT

## 2017-04-20 PROCEDURE — 77300 RADIATION THERAPY DOSE PLAN: CPT | Mod: 26

## 2017-04-20 PROCEDURE — 85610 PROTHROMBIN TIME: CPT

## 2017-04-20 PROCEDURE — C1788: CPT

## 2017-04-20 PROCEDURE — 93005 ELECTROCARDIOGRAM TRACING: CPT

## 2017-04-20 PROCEDURE — 83930 ASSAY OF BLOOD OSMOLALITY: CPT

## 2017-04-20 PROCEDURE — 77300 RADIATION THERAPY DOSE PLAN: CPT

## 2017-04-20 PROCEDURE — 80053 COMPREHEN METABOLIC PANEL: CPT

## 2017-04-20 PROCEDURE — 86850 RBC ANTIBODY SCREEN: CPT

## 2017-04-20 PROCEDURE — 84100 ASSAY OF PHOSPHORUS: CPT

## 2017-04-20 PROCEDURE — 77334 RADIATION TREATMENT AID(S): CPT

## 2017-04-20 PROCEDURE — 99222 1ST HOSP IP/OBS MODERATE 55: CPT

## 2017-04-20 PROCEDURE — 86901 BLOOD TYPING SEROLOGIC RH(D): CPT

## 2017-04-20 PROCEDURE — 97161 PT EVAL LOW COMPLEX 20 MIN: CPT

## 2017-04-20 PROCEDURE — C1769: CPT

## 2017-04-20 PROCEDURE — 77387 GUIDANCE FOR RADJ TX DLVR: CPT

## 2017-04-20 PROCEDURE — 77334 RADIATION TREATMENT AID(S): CPT | Mod: 26

## 2017-04-20 PROCEDURE — 83735 ASSAY OF MAGNESIUM: CPT

## 2017-04-20 PROCEDURE — 77332 RADIATION TREATMENT AID(S): CPT

## 2017-04-20 PROCEDURE — 80048 BASIC METABOLIC PNL TOTAL CA: CPT

## 2017-04-20 PROCEDURE — 77412 RADIATION TX DELIVERY LVL 3: CPT

## 2017-04-20 PROCEDURE — 85730 THROMBOPLASTIN TIME PARTIAL: CPT

## 2017-04-20 PROCEDURE — 76937 US GUIDE VASCULAR ACCESS: CPT

## 2017-04-20 PROCEDURE — 85025 COMPLETE CBC W/AUTO DIFF WBC: CPT

## 2017-04-20 PROCEDURE — 86900 BLOOD TYPING SEROLOGIC ABO: CPT

## 2017-04-20 PROCEDURE — 77295 3-D RADIOTHERAPY PLAN: CPT | Mod: 26

## 2017-04-20 PROCEDURE — 99239 HOSP IP/OBS DSCHRG MGMT >30: CPT

## 2017-04-20 PROCEDURE — 99285 EMERGENCY DEPT VISIT HI MDM: CPT | Mod: 25

## 2017-04-20 PROCEDURE — 36561 INSERT TUNNELED CV CATH: CPT

## 2017-04-20 PROCEDURE — 84300 ASSAY OF URINE SODIUM: CPT

## 2017-04-20 RX ORDER — TRAMADOL HYDROCHLORIDE 50 MG/1
50 TABLET ORAL EVERY 6 HOURS
Qty: 0 | Refills: 0 | Status: DISCONTINUED | OUTPATIENT
Start: 2017-04-20 | End: 2017-04-20

## 2017-04-20 RX ADMIN — Medication 650 MILLIGRAM(S): at 00:54

## 2017-04-20 RX ADMIN — TRAMADOL HYDROCHLORIDE 50 MILLIGRAM(S): 50 TABLET ORAL at 18:16

## 2017-04-20 RX ADMIN — Medication 25 MILLIGRAM(S): at 07:43

## 2017-04-20 RX ADMIN — ANASTROZOLE 1 MILLIGRAM(S): 1 TABLET ORAL at 12:47

## 2017-04-20 RX ADMIN — TRAMADOL HYDROCHLORIDE 50 MILLIGRAM(S): 50 TABLET ORAL at 19:00

## 2017-04-20 RX ADMIN — ENOXAPARIN SODIUM 40 MILLIGRAM(S): 100 INJECTION SUBCUTANEOUS at 19:49

## 2017-04-20 NOTE — PROGRESS NOTE ADULT - PROBLEM SELECTOR PLAN 5
No IVF  replete lytes prn   Cardiac Diet    FULL CODE  Dispo: Continue care/management, once radiation done d/c with close outpatient followup with Dr. Keys

## 2017-04-20 NOTE — PROGRESS NOTE ADULT - SUBJECTIVE AND OBJECTIVE BOX
INTERVAL HPI/OVERNIGHT EVENTS:  Patient was seen and examined at bedside. As per nurse and patient, no o/n events, patient resting comfortably. No complaints at this time. Patient denies: fever, chills, dizziness, weakness, HA, Changes in vision, CP, palpitations, SOB, cough, N/V/D/C, dysuria, changes in bowel movements, LE edema. ROS otherwise negative.    VITAL SIGNS:  T(F): 97.6  HR: 83  BP: 151/86  RR: 16  SpO2: 96%  Wt(kg): --    PHYSICAL EXAM:    Constitutional: WDWN, NAD  HEENT: PERRL, EOMI, sclera non-icteric, neck supple, trachea midline, no masses, no JVD, MMM, good dentition  Respiratory: CTA b/l, good air entry b/l, no wheezing, no rhonchi, no rales, without accessory muscle use and no intercostal retractions  Cardiovascular: RRR, normal S1S2, no M/R/G  Gastrointestinal: soft, NTND, no masses palpable, BS normal  Extremities: Warm, well perfused, pulses equal bilateral upper and lower extremities, no edema, no clubbing  Breast: Covered, with positive strikethrough, nurse notified who changed overlying abdominal pad, no active bleeding noted just slow oozing at baseline.  Neurological: AAOx3, CN ii-xii Grossly intact, moves all extremities  Skin: Normal temperature, warm, dry    MEDICATIONS  (STANDING):  simvastatin 20milliGRAM(s) Oral at bedtime  anastrozole 1milliGRAM(s) Oral daily  metoprolol succinate ER 25milliGRAM(s) Oral daily  enoxaparin Injectable 40milliGRAM(s) SubCutaneous every 24 hours    MEDICATIONS  (PRN):  acetaminophen   Tablet. 650milliGRAM(s) Oral every 6 hours PRN Moderate Pain (4 - 6)      Allergies    No Known Allergies    Intolerances        LABS:                        8.8    5.8   )-----------( 340      ( 20 Apr 2017 07:10 )             28.8     04-20    138  |  106  |  14  ----------------------------<  100<H>  4.3   |  25  |  0.74    Ca    8.8      20 Apr 2017 07:10  Phos  3.7     04-20  Mg     2.2     04-20    TPro  7.5  /  Alb  2.6<L>  /  TBili  0.4  /  DBili  x   /  AST  12<L>  /  ALT  15  /  AlkPhos  55  04-20    PT/INR - ( 18 Apr 2017 13:12 )   PT: 13.0 sec;   INR: 1.17          PTT - ( 18 Apr 2017 13:12 )  PTT:25.3 sec      RADIOLOGY & ADDITIONAL TESTS:  Reviewed

## 2017-04-20 NOTE — PHYSICAL THERAPY INITIAL EVALUATION ADULT - PERTINENT HX OF CURRENT PROBLEM, REHAB EVAL
Patient is a 76 year old female with Stage 4 left breast adenocarcinoma with metastatic spread to the bone presenting with bleeding breast mass.

## 2017-04-20 NOTE — PROGRESS NOTE ADULT - SUBJECTIVE AND OBJECTIVE BOX
Patient is a 76y old  Female who presents with a chief complaint of ulcerating breast mass (19 Apr 2017 12:30)      INTERVAL HPI/OVERNIGHT EVENTS:    has moderate back pain (mid back, along the lateral areas , no midline pain)  no motor weakness, sensory changes, incontinence  ambulatory  no breast mass pain or significant bleeding    Review of Systems:      ( -  )fevers/chills  ( - ) dyspnea  (  - ) cough  (  - ) chest pain  (  - ) palpatations  ( - ) dizziness/lightheadedness  (  - ) nausea/vomiting  (  - ) abd pain  (  - ) diarrhea  (  - ) melena  (  - ) hematochezia  (  - ) dysuria  ( - ) hematuria  (  - ) leg swelling  ( -) calf tenderness     ( + ) tolerating POs  ( + ) BM  ROS: 12 point review of systems otherwise negative              MEDICATIONS  (STANDING):  simvastatin 20milliGRAM(s) Oral at bedtime  anastrozole 1milliGRAM(s) Oral daily  metoprolol succinate ER 25milliGRAM(s) Oral daily  enoxaparin Injectable 40milliGRAM(s) SubCutaneous every 24 hours    MEDICATIONS  (PRN):  traMADol 50milliGRAM(s) Oral every 6 hours PRN Moderate Pain (4 - 6)      Allergies    No Known Allergies    Intolerances          Vital Signs Last 24 Hrs  T(C): 36.4, Max: 36.9 (04-19 @ 12:37)  T(F): 97.6, Max: 98.4 (04-19 @ 12:37)  HR: 83 (68 - 86)  BP: 151/86 (125/73 - 151/86)  BP(mean): --  RR: 16 (14 - 16)  SpO2: 96% (95% - 97%)  CAPILLARY BLOOD GLUCOSE        Physical Exam:    Daily       Constitutional: WDWN, NAD  HEENT: PERRL, EOMI, sclera non-icteric, neck supple, trachea midline, no masses, no JVD, MMM, good dentition  Respiratory: decr bs at bases bl,  no wheezing, no rhonchi, no rales, without accessory muscle use and no intercostal retractions  Cardiovascular: RRR, normal S1S2, no M/R/G  Gastrointestinal: soft, NTND, no masses palpable, BS normal  Extremities: Warm, well perfused, pulses equal bilateral upper and lower extremities, no edema, no clubbing  Breast:  large fungating left breast mass, w/o purulence. scant oozing along lower right portion , non tender.   Neurological: AAOx3, CN ii-xii Grossly intact, 5/5 str all 4 ext, bicep and patellar reflexes 2 + bl, sensation intact bl, babinski downgoing bl. no dysmetria.       LABS:                        8.8    5.8   )-----------( 340      ( 20 Apr 2017 07:10 )             28.8     04-20    138  |  106  |  14  ----------------------------<  100<H>  4.3   |  25  |  0.74    Ca    8.8      20 Apr 2017 07:10  Phos  3.7     04-20  Mg     2.2     04-20    TPro  7.5  /  Alb  2.6<L>  /  TBili  0.4  /  DBili  x   /  AST  12<L>  /  ALT  15  /  AlkPhos  55  04-20    PT/INR - ( 18 Apr 2017 13:12 )   PT: 13.0 sec;   INR: 1.17          PTT - ( 18 Apr 2017 13:12 )  PTT:25.3 sec        RADIOLOGY & ADDITIONAL TESTS:

## 2017-04-20 NOTE — PROGRESS NOTE ADULT - PROBLEM SELECTOR PLAN 1
s/p chemo port  pt to follow up with dr montemayor (onc) for chemo   XRT to spine today  c/w anastrozole

## 2017-04-20 NOTE — CONSULT NOTE ADULT - SUBJECTIVE AND OBJECTIVE BOX
77 yo F with a PMH of cervical cancer (1988, what Hysterectomy, [facility], without follow up), HTN, CAD, of Stage 4 left breast adenocarcinoma with metastatic spread to the bone presents with bleeding breast mass. The mass bleeds every day for the past 4 years. It has gotten progressively worse.  Since New Years she has felt her clinical condition deteriorating with increasing shortness of breath and back pain.  Approximately one month previously she presented to [place] ER where she was diagnosed with stage IV breast cancer with wide spread osseous disease including epidural disease in approximately T6 (need to review original reports to confirm) with cord compression.  She was simulated for RT, with plan to start as outpatient.  Treatment was delayed due to insurance issues as she was out of network.  Since that time, she has noticed increasing bleeding from her breast primary, and presented to [facility] ER with "hemorrhaging".  At time of this consultation, she has dressings in place with minimal bleeding.      I have been asked to assume care in terms of Radiation Oncology.  She reports pain in the mid to lower thoracic chest wall greatest when sitting up.  She denies pain at the site of her breast primary.  Overall she feels weak.  She had a port placed with plan to receive chemotherapy by Dr. Mccarthy.        Allergies    No Known Allergies    PAST MEDICAL & SURGICAL HISTORY:  CAD (coronary artery disease)  History of cervical cancer  HTN (hypertension)  Breast CA  S/P appendectomy: 1961  H/O total hysterectomy: 1988      FAMILY HISTORY:  No pertinent family history in first degree relatives  PAST MEDICAL & SURGICAL HISTORY:  CAD (coronary artery disease)  History of cervical cancer  HTN (hypertension)  Breast CA  S/P appendectomy: 1961  H/O total hysterectomy: 1988      Vital Signs Last 24 Hrs  T(C): 36.9, Max: 36.9 (04-19 @ 12:37)  T(F): 98.4, Max: 98.4 (04-19 @ 12:37)  HR: 68 (68 - 79)  BP: 125/73 (122/70 - 162/79)  BP(mean): --  RR: 14 (14 - 16)  SpO2: 96% (96% - 97%)    I&O's Detail    Data Review:  I have reviewed the reports of her MRI and PET/CT.  I have obtained the outside imaging CD which I will have digitized into the [facility] PACS      Physical Exam:  Gen: In bed, A/Ox3, NAD  Card: RRR, No m/r/g  Lungs: CTAB/L, no wheezes or crackles  Left breast with bulky mass with necrotic surface, foul smelling, nontender, no erythema or obvious purulence but fibrinous/necrotic tissue throughout. Bulky axillary lymphadenopathy  Right breast soft, no obvious mass, nontender  Abdomen: Soft, nontender, nondistended  Extremities: No edema      LABS:                         9.0    5.3   )-----------( 310      ( 19 Apr 2017 08:04 )             29.4     04-19    138  |  105  |  15  ----------------------------<  93  4.1   |  25  |  0.65    Ca    9.2      19 Apr 2017 08:04  Phos  3.9     04-19  Mg     2.1     04-19    TPro  8.0  /  Alb  2.7<L>  /  TBili  0.4  /  DBili  x   /  AST  52<H>  /  ALT  20  /  AlkPhos  58  04-18    PT/INR - ( 18 Apr 2017 13:12 )   PT: 13.0 sec;   INR: 1.17          PTT - ( 18 Apr 2017 13:12 )  PTT:25.3 sec
77 y/o woman with a fungating left breast cancer.  Complicated by wound erosion and bleeding.  Likely to require palliative mastectomy in the near future for wound management.  Mastectomy is anticipated to result in a substantial soft tissue defect.  Will plan to manage this defect with advancement of adjacent skin vs. a regional flap.  No plan for formal reconstruction, just wound closure.  Nature of the surgery discussed with patient in detail.  Given chronic nature of fungating cancer it is likely carrying a high bacterial burden, which puts the patient at an increased risk of wound healing complications.  Will coordinate potential surgery with Dr. De Santiago.
HPI (As per H&P):  77 yo F with a PMH of cervical cancer (1988, what Hysterectomy, [facility], without follow up), HTN, CAD, of Stage 4 left breast adenocarcinoma with metastatic spread to the bone presents with bleeding breast mass. The mass bleeds every day for the past 4 years. It has gotten progressivey worse with increased enhancement in the last three months. Today, the patient removed a dressing from the mass and the bleeding was significant the the patient described the bleeding as hemorrhaging".     fungating mass surgery says nothing to do possibly debulking at a later time     Dr. Yanez Radiation Oncologist (18 Apr 2017 16:38)    General Surgery/Breast Surgery Subjective  Patient seen and examined at bedside. Although alert and oriented x3 the patient does have some scattered thought process and would often go on tangents. She is a 77 yo female that states she is healthy although does not follow with doctors. She had cervical CA s/p hysterectomy ~40 years ago without follow up. Starting around 4 years ago she began to notice a nonpainful breast mass on left breast. 2 years ago the mass eroded through skin and she noticed some drainage. The mass continued to grow and ~3 weeks ago she sought medical advice at OSH. Through this time she denies fevers, chills, weight loss, nausea/vomiting or pain. She did say that the mass would leak and ooze blood. She had full work up which showed metastatic breast CA with spinal bone mets and bulky lymphadenopathy, full report in paper chart. She came to ED today because she was scheduled for radiation to spine when she began bleeding from mass, significantly enough to bring her to ER. This was the first occur ence of this much bleeding. In the ED the bleeding had stopped. She denied dizziness. H/H 9/29.5. The patient states she also told the EMS to bring to [facility] because more providers were in her network compared to OSH.     PAST MEDICAL & SURGICAL HISTORY:  History of cervical cancer  HTN (hypertension)  Breast CA          MEDICATIONS  (STANDING):    MEDICATIONS  (PRN):      Allergies    No Known Allergies    Intolerances        SOCIAL HISTORY:        Vital Signs Last 24 Hrs  T(C): 36.7, Max: 36.8 (04-18 @ 12:29)  T(F): 98.1, Max: 98.3 (04-18 @ 12:29)  HR: 79 (79 - 88)  BP: 155/80 (119/66 - 155/80)  BP(mean): --  RR: 18 (18 - 18)  SpO2: 95% (95% - 96%)    Physical Exam:  Gen: In bed, A/Ox3, NAD  Card: RRR, No m/r/g  Lungs: CTAB/L, no wheezes or crackles  Left breast with bulky mass with necrotic surface, foul smelling, nontender, no erythema or obvious purulence but fibrinous/necrotic tissue throughout. Bulky axillary lymphadenopathy  Right breast soft, no obvious mass, nontender  Abdomen: Soft, nontender, nondistended  Extremities: No edema    LABS:                        9.0    7.5   )-----------( 294      ( 18 Apr 2017 13:12 )             29.5     04-18    134<L>  |  105  |  18  ----------------------------<  94  5.2   |  23  |  0.66    Ca    9.0      18 Apr 2017 13:12    TPro  8.0  /  Alb  2.7<L>  /  TBili  0.4  /  DBili  x   /  AST  52<H>  /  ALT  20  /  AlkPhos  58  04-18    PT/INR - ( 18 Apr 2017 13:12 )   PT: 13.0 sec;   INR: 1.17          PTT - ( 18 Apr 2017 13:12 )  PTT:25.3 sec      RADIOLOGY & ADDITIONAL STUDIES:

## 2017-04-20 NOTE — PROGRESS NOTE ADULT - PROBLEM SELECTOR PLAN 6
No IVF  replete lytes prn   Cardiac Diet    FULL CODE  Dispo: Continue care/management, once Beau sees patient will follow up re: d/c vs radiation
lovenox

## 2017-04-20 NOTE — PROGRESS NOTE ADULT - PROBLEM SELECTOR PLAN 5
likely secondary to persistent slow ooze from lesion site as well as anemia of chronic disease.   hemoglobin ~ 9   HD stable.   - hgb stable

## 2017-04-20 NOTE — PROGRESS NOTE ADULT - ASSESSMENT
Patient is a 77 yo with PMH of cervical cancer, CAD, Stage 4 left breast adenocarcinoma with metastatic spread to the bone presents with bleeding breast mass, s/p chemoport placement will go for radiation today and discharge later today

## 2017-04-20 NOTE — PROGRESS NOTE ADULT - PROBLEM SELECTOR PLAN 1
Pt with large exophytic mass on left breast as described in PE. Pt treated by Dr. Velazquez at [facility] as inpatient, had initial evaluation with Dr. Keys on Monday, placed on anastrozole by Dr. Keys and referrals made to  rad onc ( Dr. De La Torre)  --he  will evaluate patient   port placed today as Dr Keys is planning systemic chemo in the near future  -Surgery consulted nothing acute to do at this time. Dr. De Santiago following, recs appreciated. Surgery team available if begins to rebleed and requires immediate intervention [phone]  - c/w anastrozole  -  eval by Dr. Frias on behalf of Dr. Yanez, will have radiation today then d/c  - wound care consulted, recommendations ordered

## 2017-04-20 NOTE — PROGRESS NOTE ADULT - PROBLEM SELECTOR PLAN 3
likely secondary to persistent slow ooze from lesion site as well as anemia of chronic disease. hemoglobin 9 currently unknown baseline. Pt HD stable.   - hgb stable

## 2017-04-20 NOTE — PROGRESS NOTE ADULT - SUBJECTIVE AND OBJECTIVE BOX
SUBJECTIVE: Pt seen and examined at bedside. No overnight events. No f/c/n/v.    Vital Signs Last 24 Hrs  T(C): 36.4, Max: 36.9 (04-19 @ 12:37)  T(F): 97.6, Max: 98.4 (04-19 @ 12:37)  HR: 83 (68 - 86)  BP: 151/86 (125/73 - 151/86)  BP(mean): --  RR: 16 (14 - 16)  SpO2: 96% (95% - 97%)    PHYSICAL EXAM    Gen: NAD  CV RRR  Pulm: Regular non-labored respirations  Breast: Left breast with bulky mass with necrotic surface, nontender, no erythema or obvious purulence but fibrinous/necrotic tissue throughout. Some oozing noted under pressure dressing. Left axillary lymphadenopathy noted.  Abd: Soft, nontender, nondistended  Ext: WWP      LABS:                        8.8    5.8   )-----------( 340      ( 20 Apr 2017 07:10 )             28.8     04-20    138  |  106  |  14  ----------------------------<  100<H>  4.3   |  25  |  0.74    Ca    8.8      20 Apr 2017 07:10  Phos  3.7     04-20  Mg     2.2     04-20    TPro  7.5  /  Alb  2.6<L>  /  TBili  0.4  /  DBili  x   /  AST  12<L>  /  ALT  15  /  AlkPhos  55  04-20    PT/INR - ( 18 Apr 2017 13:12 )   PT: 13.0 sec;   INR: 1.17          PTT - ( 18 Apr 2017 13:12 )  PTT:25.3 sec      ASSESSMENT AND PLAN  77 yo female with metastatic breast CA and fungating breast mass, H/H continues to be stable.  -No urgent surgical intervention at this time  -If discharging pt, may follow up with Dr. De Santiago in the office. However, if pt staying in the hospital, would obtain a Heme/Onc consult, and consult Plastic Surgery (Dr. Harper) for wound coverage in the case that a mastectomy is performed for wound control.   -Further management per primary team  -D/W Dr. De Santiago

## 2017-04-20 NOTE — PROGRESS NOTE ADULT - PROBLEM SELECTOR PLAN 4
as per patient, started while at [facility] for suspected old infarct  -c/w statin, asa, beta blocker

## 2017-04-20 NOTE — PROGRESS NOTE ADULT - PROBLEM SELECTOR PLAN 2
as per patient, started while at [facility] for suspected old infarct  -c/w statin, asa, beta blocker

## 2017-04-20 NOTE — PROGRESS NOTE ADULT - ASSESSMENT
75 yo with PMH of cervical cancer, CAD, Stage 4 left breast adenocarcinoma with metastatic spread to the bone presents with bleeding breast mass, s/p chemoport placement will go for radiation today and discharge later today

## 2017-04-24 DIAGNOSIS — Z87.891 PERSONAL HISTORY OF NICOTINE DEPENDENCE: ICD-10-CM

## 2017-04-24 DIAGNOSIS — C79.51 SECONDARY MALIGNANT NEOPLASM OF BONE: ICD-10-CM

## 2017-04-24 DIAGNOSIS — Z90.710 ACQUIRED ABSENCE OF BOTH CERVIX AND UTERUS: ICD-10-CM

## 2017-04-24 DIAGNOSIS — N61.1 ABSCESS OF THE BREAST AND NIPPLE: ICD-10-CM

## 2017-04-24 DIAGNOSIS — I25.10 ATHEROSCLEROTIC HEART DISEASE OF NATIVE CORONARY ARTERY WITHOUT ANGINA PECTORIS: ICD-10-CM

## 2017-04-24 DIAGNOSIS — C50.912 MALIGNANT NEOPLASM OF UNSPECIFIED SITE OF LEFT FEMALE BREAST: ICD-10-CM

## 2017-04-24 DIAGNOSIS — Z85.41 PERSONAL HISTORY OF MALIGNANT NEOPLASM OF CERVIX UTERI: ICD-10-CM

## 2017-04-24 DIAGNOSIS — D63.0 ANEMIA IN NEOPLASTIC DISEASE: ICD-10-CM

## 2017-04-24 DIAGNOSIS — I10 ESSENTIAL (PRIMARY) HYPERTENSION: ICD-10-CM

## 2017-04-24 DIAGNOSIS — Z79.82 LONG TERM (CURRENT) USE OF ASPIRIN: ICD-10-CM

## 2017-04-24 DIAGNOSIS — D50.0 IRON DEFICIENCY ANEMIA SECONDARY TO BLOOD LOSS (CHRONIC): ICD-10-CM

## 2017-04-24 DIAGNOSIS — G95.20 UNSPECIFIED CORD COMPRESSION: ICD-10-CM

## 2017-04-24 DIAGNOSIS — R59.1 GENERALIZED ENLARGED LYMPH NODES: ICD-10-CM

## 2017-04-24 DIAGNOSIS — E22.2 SYNDROME OF INAPPROPRIATE SECRETION OF ANTIDIURETIC HORMONE: ICD-10-CM

## 2017-04-25 ENCOUNTER — APPOINTMENT (OUTPATIENT)
Dept: BREAST CENTER | Facility: CLINIC | Age: 77
End: 2017-04-25

## 2017-04-28 ENCOUNTER — APPOINTMENT (OUTPATIENT)
Dept: BREAST CENTER | Facility: CLINIC | Age: 77
End: 2017-04-28

## 2017-04-28 VITALS
HEIGHT: 62 IN | SYSTOLIC BLOOD PRESSURE: 109 MMHG | WEIGHT: 162 LBS | BODY MASS INDEX: 29.81 KG/M2 | HEART RATE: 113 BPM | TEMPERATURE: 99.5 F | DIASTOLIC BLOOD PRESSURE: 69 MMHG

## 2017-04-28 DIAGNOSIS — I10 ESSENTIAL (PRIMARY) HYPERTENSION: ICD-10-CM

## 2017-04-28 DIAGNOSIS — I25.10 ATHEROSCLEROTIC HEART DISEASE OF NATIVE CORONARY ARTERY W/OUT ANGINA PECTORIS: ICD-10-CM

## 2017-04-28 DIAGNOSIS — E87.1 HYPO-OSMOLALITY AND HYPONATREMIA: ICD-10-CM

## 2017-04-28 DIAGNOSIS — Z86.2 PERSONAL HISTORY OF DISEASES OF THE BLOOD AND BLOOD-FORMING ORGANS AND CERTAIN DISORDERS INVOLVING THE IMMUNE MECHANISM: ICD-10-CM

## 2017-04-28 RX ORDER — SIMVASTATIN 20 MG/1
20 TABLET, FILM COATED ORAL
Refills: 0 | Status: ACTIVE | COMMUNITY

## 2017-04-28 RX ORDER — ASPIRIN 81 MG
81 TABLET, DELAYED RELEASE (ENTERIC COATED) ORAL
Refills: 0 | Status: ACTIVE | COMMUNITY

## 2017-04-28 RX ORDER — TRAMADOL HYDROCHLORIDE 50 MG/1
50 TABLET, COATED ORAL
Refills: 0 | Status: ACTIVE | COMMUNITY

## 2017-06-07 ENCOUNTER — INPATIENT (INPATIENT)
Facility: HOSPITAL | Age: 77
LOS: 0 days | Discharge: HOME | DRG: 812 | End: 2017-06-08
Attending: STUDENT IN AN ORGANIZED HEALTH CARE EDUCATION/TRAINING PROGRAM | Admitting: STUDENT IN AN ORGANIZED HEALTH CARE EDUCATION/TRAINING PROGRAM
Payer: COMMERCIAL

## 2017-06-07 VITALS
OXYGEN SATURATION: 99 % | HEART RATE: 90 BPM | DIASTOLIC BLOOD PRESSURE: 73 MMHG | RESPIRATION RATE: 16 BRPM | TEMPERATURE: 99 F | WEIGHT: 126.1 LBS | SYSTOLIC BLOOD PRESSURE: 148 MMHG | HEIGHT: 63 IN

## 2017-06-07 DIAGNOSIS — Z90.710 ACQUIRED ABSENCE OF BOTH CERVIX AND UTERUS: Chronic | ICD-10-CM

## 2017-06-07 DIAGNOSIS — Z90.49 ACQUIRED ABSENCE OF OTHER SPECIFIED PARTS OF DIGESTIVE TRACT: Chronic | ICD-10-CM

## 2017-06-07 LAB
ANION GAP SERPL CALC-SCNC: 11 MMOL/L — SIGNIFICANT CHANGE UP (ref 5–17)
APTT BLD: 22.4 SEC — LOW (ref 27.5–37.4)
BASOPHILS NFR BLD AUTO: 0.2 % — SIGNIFICANT CHANGE UP (ref 0–2)
BLD GP AB SCN SERPL QL: NEGATIVE — SIGNIFICANT CHANGE UP
BUN SERPL-MCNC: 20 MG/DL — SIGNIFICANT CHANGE UP (ref 7–23)
CALCIUM SERPL-MCNC: 9.2 MG/DL — SIGNIFICANT CHANGE UP (ref 8.4–10.5)
CHLORIDE SERPL-SCNC: 98 MMOL/L — SIGNIFICANT CHANGE UP (ref 96–108)
CO2 SERPL-SCNC: 24 MMOL/L — SIGNIFICANT CHANGE UP (ref 22–31)
CREAT SERPL-MCNC: 0.8 MG/DL — SIGNIFICANT CHANGE UP (ref 0.5–1.3)
EOSINOPHIL NFR BLD AUTO: 0.3 % — SIGNIFICANT CHANGE UP (ref 0–6)
GLUCOSE SERPL-MCNC: 106 MG/DL — HIGH (ref 70–99)
HCT VFR BLD CALC: 25.1 % — LOW (ref 34.5–45)
HGB BLD-MCNC: 7.8 G/DL — LOW (ref 11.5–15.5)
INR BLD: 1.26 — HIGH (ref 0.88–1.16)
LYMPHOCYTES # BLD AUTO: 4 % — LOW (ref 13–44)
MCHC RBC-ENTMCNC: 24.4 PG — LOW (ref 27–34)
MCHC RBC-ENTMCNC: 31.1 G/DL — LOW (ref 32–36)
MCV RBC AUTO: 78.4 FL — LOW (ref 80–100)
MONOCYTES NFR BLD AUTO: 6.9 % — SIGNIFICANT CHANGE UP (ref 2–14)
NEUTROPHILS NFR BLD AUTO: 88.6 % — HIGH (ref 43–77)
PLATELET # BLD AUTO: 347 K/UL — SIGNIFICANT CHANGE UP (ref 150–400)
POTASSIUM SERPL-MCNC: 5.1 MMOL/L — SIGNIFICANT CHANGE UP (ref 3.5–5.3)
POTASSIUM SERPL-SCNC: 5.1 MMOL/L — SIGNIFICANT CHANGE UP (ref 3.5–5.3)
PROTHROM AB SERPL-ACNC: 14.1 SEC — HIGH (ref 9.8–12.7)
RBC # BLD: 3.2 M/UL — LOW (ref 3.8–5.2)
RBC # FLD: 15.5 % — SIGNIFICANT CHANGE UP (ref 10.3–16.9)
RH IG SCN BLD-IMP: POSITIVE — SIGNIFICANT CHANGE UP
SODIUM SERPL-SCNC: 133 MMOL/L — LOW (ref 135–145)
WBC # BLD: 12 K/UL — HIGH (ref 3.8–10.5)
WBC # FLD AUTO: 12 K/UL — HIGH (ref 3.8–10.5)

## 2017-06-07 PROCEDURE — 99285 EMERGENCY DEPT VISIT HI MDM: CPT

## 2017-06-07 PROCEDURE — 99223 1ST HOSP IP/OBS HIGH 75: CPT

## 2017-06-07 RX ORDER — TRAMADOL HYDROCHLORIDE 50 MG/1
50 TABLET ORAL EVERY 4 HOURS
Qty: 0 | Refills: 0 | Status: DISCONTINUED | OUTPATIENT
Start: 2017-06-07 | End: 2017-06-08

## 2017-06-07 RX ORDER — SIMVASTATIN 20 MG/1
20 TABLET, FILM COATED ORAL AT BEDTIME
Qty: 0 | Refills: 0 | Status: DISCONTINUED | OUTPATIENT
Start: 2017-06-07 | End: 2017-06-08

## 2017-06-07 RX ORDER — FERROUS SULFATE 325(65) MG
325 TABLET ORAL DAILY
Qty: 0 | Refills: 0 | Status: DISCONTINUED | OUTPATIENT
Start: 2017-06-07 | End: 2017-06-08

## 2017-06-07 RX ORDER — METOPROLOL TARTRATE 50 MG
25 TABLET ORAL DAILY
Qty: 0 | Refills: 0 | Status: DISCONTINUED | OUTPATIENT
Start: 2017-06-07 | End: 2017-06-08

## 2017-06-07 RX ORDER — ANASTROZOLE 1 MG/1
1 TABLET ORAL DAILY
Qty: 0 | Refills: 0 | Status: DISCONTINUED | OUTPATIENT
Start: 2017-06-07 | End: 2017-06-08

## 2017-06-07 NOTE — ED ADULT NURSE NOTE - CHPI ED SYMPTOMS NEG
no chills/no decreased eating/drinking/no numbness/no tingling/no nausea/no fever/no dizziness/no weakness/no vomiting

## 2017-06-07 NOTE — H&P ADULT - NSHPLABSRESULTS_GEN_ALL_CORE
Complete Blood Count + Automated Diff (06.07.17 @ 19:08)    Hemoglobin: 7.8 g/dL    Mean Cell Hemoglobin: 24.4 pg    Mean Cell Hemoglobin Conc: 31.1 g/dL  Basic Metabolic Panel (06.07.17 @ 19:08)    Sodium, Serum: 133 mmol/L    Potassium, Serum: 5.1 mmol/L    Chloride, Serum: 98 mmol/L    Carbon Dioxide, Serum: 24 mmol/L    Anion Gap, Serum: 11 mmol/L    Blood Urea Nitrogen, Serum: 20 mg/dL    Creatinine, Serum: 0.80 mg/dL    Glucose, Serum: 106 mg/dL    Calcium, Total Serum: 9.2 mg/dL    eGFR if Non : 72:

## 2017-06-07 NOTE — H&P ADULT - NSHPREVIEWOFSYSTEMS_GEN_ALL_CORE
REVIEW OF SYSTEMS:  CONSTITUTIONAL: No weakness, fevers or chills  EYES/ENT: No visual changes;  No vertigo or throat pain   NECK: No pain or stiffness  RESPIRATORY: No cough, wheezing, hemoptysis; No shortness of breath  CARDIOVASCULAR: No chest pain or palpitations  GASTROINTESTINAL: No abdominal or epigastric pain. No nausea, vomiting, or hematemesis; No diarrhea or constipation. No melena or hematochezia.  GENITOURINARY: No dysuria, frequency or hematuria  NEUROLOGICAL: No numbness or weakness  SKIN: No itching, burning, rashes, or lesions   All other review of systems is negative unless indicated above. REVIEW OF SYSTEMS:  CONSTITUTIONAL: No fevers or chills  EYES/ENT: No visual changes;  No vertigo or throat pain   NECK: No pain or stiffness  RESPIRATORY: No cough, wheezing, hemoptysis; No shortness of breath  CARDIOVASCULAR: No chest pain or palpitations  GASTROINTESTINAL: No abdominal or epigastric pain. No nausea, vomiting, or hematemesis; No diarrhea or constipation. No melena or hematochezia.  GENITOURINARY: No dysuria, frequency or hematuria  NEUROLOGICAL: No numbness or weakness  SKIN: intermittent daily pink drainage from her L breast mass  All other review of systems is negative unless indicated above.

## 2017-06-07 NOTE — ED ADULT NURSE REASSESSMENT NOTE - NS ED NURSE REASSESS COMMENT FT1
pt. remains in designated area alert and awake, communicates w/o difficulty, no c/o pain, pt. had dinner, tolerated well, blood transfusion started, pt. was educated on transfusion reaction s/s, pt. verbalized understanding, at present time no s/s of reaction, VS stable, will monitor. pt. remains in designated area alert and awake, communicates w/o difficulty, pt. has swelling and tissue overgrowth with yellow and greenish discharge to Lt breast, the area around the breast is red and dry, skin excoriations noted to the area. pt. has no c/o pain, pt. had dinner, tolerated well, blood transfusion started, pt. was educated on transfusion reaction s/s, pt. verbalized understanding, at present time no s/s of reaction, VS stable, will monitor.

## 2017-06-07 NOTE — H&P ADULT - NSHPPHYSICALEXAM_GEN_ALL_CORE
.  VITAL SIGNS:  T(C): 36.8, Max: 37 (06-07 @ 16:54)  T(F): 98.2, Max: 98.6 (06-07 @ 16:54)  HR: 82 (81 - 90)  BP: 118/66 (112/66 - 148/73)  BP(mean): --  RR: 18 (16 - 18)  SpO2: 97% (97% - 99%)  Wt(kg): --    PHYSICAL EXAM:    Constitutional: WDWN resting comfortably in bed; NAD  Head: NC/AT  Eyes: PERRL, EOMI, clear conjunctiva  ENT: no nasal discharge; uvula midline, no oropharyngeal erythema or exudates; dry mucous membranes   Neck: supple; no JVD or thyromegaly  Respiratory: CTA B/L; no W/R/R, no retractions  Cardiac: +S1/S2; RRR; no M/R/G; PMI non-displaced  Gastrointestinal: soft, NT/ND; no rebound or guarding; +BSx4  Extremities: WWP, no clubbing or cyanosis; no peripheral edema  Musculoskeletal: NROM x4; no joint swelling, tenderness or erythema  Vascular: 2+ radial, DP pulses B/L  Dermatologic: left breast fungating mass w/necrotic tissue, malodorous, skin breakdown along the breast tissue w/oozing   Neurologic: AAOx3; CNII-XII grossly intact; no focal deficits  Psychiatric: affect and characteristics of appearance, verbalizations, behaviors are appropriate

## 2017-06-07 NOTE — H&P ADULT - PROBLEM SELECTOR PLAN 1
pt w/anemia secondary to active bleeding from malignant fungating breast mass, w/fatigue, transfused 1unit prbc in the ED  -will monitor wound dressing and w/pressure applied in areas of bleeding  -will monitor cbc  -wound care consult, c/w dressings as prescribed on previous admission: wound , silicone dressing, aquacel overlying the silicone dressing, to be changed every 5 days  -active type and screen  -will f/u with pt's oncologist as below
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
## 2017-06-07 NOTE — ED ADULT NURSE NOTE - OBJECTIVE STATEMENT
Pt presents to ED A&Ox3 stating she has had increased bleeding from her left breast where she is receiving radiation for breast ca. pt noted bleeding on monday but was able to control it. last radiation was yesterday. pt denies cp/sob, dizziness, weakness, fever, n/v/d.
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
## 2017-06-07 NOTE — H&P ADULT - PROBLEM SELECTOR PLAN 2
Pt follows with Dr. Keys/Dr. Andrews as well as Drew and Dr. Frias, her radiation oncologist, will notify of patient's admission and appreciate recs  -c/w anastrazole 1mg daily  -will start doxycycline 100mg BID for ten days to cover for possible overlying cellulitis given leukocytosis Pt follows with Dr. Keys/Dr. Andrews as well as Drew and Dr. Frias, her radiation oncologist, will notify of patient's admission and appreciate recs  -c/w anastrazole 1mg daily  -will start clindamycin 300mg QID for 7 days to cover for possible overlying cellulitis given leukocytosis  -for moderate pain c/w home tramadol 50mg Q 4hrs w/percocet for severe pain

## 2017-06-07 NOTE — ED ADULT TRIAGE NOTE - CHIEF COMPLAINT QUOTE
Pt CO Bleeding to Left Breast s/t Tumor.  Pt states "I had radiation yesterday and my doctor told me to come in."  Pt denies N/V/D, SOB, Fevers

## 2017-06-07 NOTE — H&P ADULT - ASSESSMENT
Pt is a 78 yo F w/pmhx of HTN, HLD, anemia and fungating L breast ca w/recent admission for L breast bleeding who presented w/L breast bleeding found to be anemia and hyponatremic.

## 2017-06-07 NOTE — H&P ADULT - ATTENDING COMMENTS
77F hx remote cervical cancer s/p hysterectomy, HLD, HTN, anemia, fungating breast ca dx 3/2017 s/p 2 treatments of chemo and currently undergoing XRT who presents with uncontrolled bleeding from her breast mass. Pt normally has bleeding post radiation but able to control with pressure, but today removed her dressing and noted more bleeding than usual and was unable to control at home, so she skipped XRT and came to ED. In ED, pt found to be anemic Hgb 7.8 (prev 8.8). Of note, pt has not been following wound care instructions provided during last admission (similar presentation).  VS and exam per PGY-2 note  Labs reviewed  A/P: 1) Microcytic anemia--Source likely uncontrolled bleeding from breast mass. Bleeding now slowed down. Transfused 1U PRBC in ED. F/u repeat CBC. PO iron supplementation.  2) Fungating breast ca--C/w Anastrozole. Collateral info from XRT. Pt may require inpt XRT if to stay in hospital, but likely can be discharged tomorrow. Wound care consult.  3) Leukocytosis--No fevers, but can not r/o overlying breast cellulitis given extent of wound. Previous surgical consult note warns of likely excessive bacterial contamination of site. Will start Clindamycin empirically for treatment for 7 day course.  4) Hyponatremia--Mild. Likely hypovolemic and also likely component of insensible losses from breast mass and radiation alba. Recheck Na in AM.  5) HTN/HLD--Stable. Continue home meds.

## 2017-06-07 NOTE — ED PROVIDER NOTE - CARE PLAN
Principal Discharge DX:	Anemia, unspecified type  Secondary Diagnosis:	Malignant neoplasm of left female breast, unspecified site of breast

## 2017-06-07 NOTE — ED PROVIDER NOTE - SKIN, MLM
Skin pale, warm, dry and intact. Large L breast fungating mass, foul-smelling. Somewhat brisk venous oozing controlled with direct pressure. Non-adherent dressing, covered by wet gauze and abd pads applied with cessation of bleeding
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
## 2017-06-07 NOTE — ED PROVIDER NOTE - MEDICAL DECISION MAKING DETAILS
Pt p/w bleeding from fungating breast mass, likely 2/2 use on adherent dressings and peeling. Direct pressure applied, non-adherent dressing applied. Will check labs and eval for re-bleeding. Dispo pending w/u and clinical status
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
## 2017-06-07 NOTE — H&P ADULT - PROBLEM SELECTOR PLAN 3
pt w/leukocytosis of 12.0 =, neutrophilia of 88%, afebrile, w/active necrotic breast mass, likely etiologies include reactive leukocytosis after radiation vs overlying cellulitis   -will start doxycycline 100mg BID for ten days to cover for possible overlying cellulitis given leukocytosis given recent hospitalizations, MRSA prevalence in [place] and weakened immune system in the setting of radiation and CA
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
## 2017-06-08 VITALS
TEMPERATURE: 98 F | RESPIRATION RATE: 18 BRPM | DIASTOLIC BLOOD PRESSURE: 69 MMHG | SYSTOLIC BLOOD PRESSURE: 138 MMHG | OXYGEN SATURATION: 97 % | HEART RATE: 69 BPM

## 2017-06-08 DIAGNOSIS — D62 ACUTE POSTHEMORRHAGIC ANEMIA: ICD-10-CM

## 2017-06-08 DIAGNOSIS — Z29.9 ENCOUNTER FOR PROPHYLACTIC MEASURES, UNSPECIFIED: ICD-10-CM

## 2017-06-08 DIAGNOSIS — R63.8 OTHER SYMPTOMS AND SIGNS CONCERNING FOOD AND FLUID INTAKE: ICD-10-CM

## 2017-06-08 DIAGNOSIS — E87.1 HYPO-OSMOLALITY AND HYPONATREMIA: ICD-10-CM

## 2017-06-08 DIAGNOSIS — D72.829 ELEVATED WHITE BLOOD CELL COUNT, UNSPECIFIED: ICD-10-CM

## 2017-06-08 DIAGNOSIS — C50.912 MALIGNANT NEOPLASM OF UNSPECIFIED SITE OF LEFT FEMALE BREAST: ICD-10-CM

## 2017-06-08 DIAGNOSIS — D72.828 OTHER ELEVATED WHITE BLOOD CELL COUNT: ICD-10-CM

## 2017-06-08 DIAGNOSIS — G89.3 NEOPLASM RELATED PAIN (ACUTE) (CHRONIC): ICD-10-CM

## 2017-06-08 DIAGNOSIS — I10 ESSENTIAL (PRIMARY) HYPERTENSION: ICD-10-CM

## 2017-06-08 DIAGNOSIS — I25.10 ATHEROSCLEROTIC HEART DISEASE OF NATIVE CORONARY ARTERY WITHOUT ANGINA PECTORIS: ICD-10-CM

## 2017-06-08 LAB
ANION GAP SERPL CALC-SCNC: 13 MMOL/L — SIGNIFICANT CHANGE UP (ref 5–17)
ANION GAP SERPL CALC-SCNC: 13 MMOL/L — SIGNIFICANT CHANGE UP (ref 5–17)
ANION GAP SERPL CALC-SCNC: 14 MMOL/L — SIGNIFICANT CHANGE UP (ref 5–17)
BASOPHILS NFR BLD AUTO: 0.4 % — SIGNIFICANT CHANGE UP (ref 0–2)
BUN SERPL-MCNC: 18 MG/DL — SIGNIFICANT CHANGE UP (ref 7–23)
BUN SERPL-MCNC: 19 MG/DL — SIGNIFICANT CHANGE UP (ref 7–23)
BUN SERPL-MCNC: 21 MG/DL — SIGNIFICANT CHANGE UP (ref 7–23)
CALCIUM SERPL-MCNC: 8.2 MG/DL — LOW (ref 8.4–10.5)
CALCIUM SERPL-MCNC: 8.5 MG/DL — SIGNIFICANT CHANGE UP (ref 8.4–10.5)
CALCIUM SERPL-MCNC: 8.6 MG/DL — SIGNIFICANT CHANGE UP (ref 8.4–10.5)
CHLORIDE SERPL-SCNC: 96 MMOL/L — SIGNIFICANT CHANGE UP (ref 96–108)
CHLORIDE SERPL-SCNC: 96 MMOL/L — SIGNIFICANT CHANGE UP (ref 96–108)
CHLORIDE SERPL-SCNC: 97 MMOL/L — SIGNIFICANT CHANGE UP (ref 96–108)
CO2 SERPL-SCNC: 21 MMOL/L — LOW (ref 22–31)
CO2 SERPL-SCNC: 22 MMOL/L — SIGNIFICANT CHANGE UP (ref 22–31)
CO2 SERPL-SCNC: 22 MMOL/L — SIGNIFICANT CHANGE UP (ref 22–31)
CREAT SERPL-MCNC: 0.7 MG/DL — SIGNIFICANT CHANGE UP (ref 0.5–1.3)
CREAT SERPL-MCNC: 0.8 MG/DL — SIGNIFICANT CHANGE UP (ref 0.5–1.3)
CREAT SERPL-MCNC: 0.9 MG/DL — SIGNIFICANT CHANGE UP (ref 0.5–1.3)
EOSINOPHIL NFR BLD AUTO: 2.1 % — SIGNIFICANT CHANGE UP (ref 0–6)
GLUCOSE SERPL-MCNC: 107 MG/DL — HIGH (ref 70–99)
GLUCOSE SERPL-MCNC: 155 MG/DL — HIGH (ref 70–99)
GLUCOSE SERPL-MCNC: 92 MG/DL — SIGNIFICANT CHANGE UP (ref 70–99)
HCT VFR BLD CALC: 25.3 % — LOW (ref 34.5–45)
HCT VFR BLD CALC: 27 % — LOW (ref 34.5–45)
HGB BLD-MCNC: 8 G/DL — LOW (ref 11.5–15.5)
HGB BLD-MCNC: 8.4 G/DL — LOW (ref 11.5–15.5)
LYMPHOCYTES # BLD AUTO: 7.7 % — LOW (ref 13–44)
MAGNESIUM SERPL-MCNC: 1.9 MG/DL — SIGNIFICANT CHANGE UP (ref 1.6–2.6)
MCHC RBC-ENTMCNC: 24.8 PG — LOW (ref 27–34)
MCHC RBC-ENTMCNC: 24.9 PG — LOW (ref 27–34)
MCHC RBC-ENTMCNC: 31.1 G/DL — LOW (ref 32–36)
MCHC RBC-ENTMCNC: 31.6 G/DL — LOW (ref 32–36)
MCV RBC AUTO: 78.8 FL — LOW (ref 80–100)
MCV RBC AUTO: 79.6 FL — LOW (ref 80–100)
MONOCYTES NFR BLD AUTO: 9.3 % — SIGNIFICANT CHANGE UP (ref 2–14)
NEUTROPHILS NFR BLD AUTO: 80.5 % — HIGH (ref 43–77)
OSMOLALITY SERPL: 278 MOSM/KG — LOW (ref 280–301)
PHOSPHATE SERPL-MCNC: 4.2 MG/DL — SIGNIFICANT CHANGE UP (ref 2.5–4.5)
PLATELET # BLD AUTO: 264 K/UL — SIGNIFICANT CHANGE UP (ref 150–400)
PLATELET # BLD AUTO: 346 K/UL — SIGNIFICANT CHANGE UP (ref 150–400)
POTASSIUM SERPL-MCNC: 4.1 MMOL/L — SIGNIFICANT CHANGE UP (ref 3.5–5.3)
POTASSIUM SERPL-MCNC: 4.4 MMOL/L — SIGNIFICANT CHANGE UP (ref 3.5–5.3)
POTASSIUM SERPL-MCNC: 4.7 MMOL/L — SIGNIFICANT CHANGE UP (ref 3.5–5.3)
POTASSIUM SERPL-SCNC: 4.1 MMOL/L — SIGNIFICANT CHANGE UP (ref 3.5–5.3)
POTASSIUM SERPL-SCNC: 4.4 MMOL/L — SIGNIFICANT CHANGE UP (ref 3.5–5.3)
POTASSIUM SERPL-SCNC: 4.7 MMOL/L — SIGNIFICANT CHANGE UP (ref 3.5–5.3)
RBC # BLD: 3.21 M/UL — LOW (ref 3.8–5.2)
RBC # BLD: 3.39 M/UL — LOW (ref 3.8–5.2)
RBC # FLD: 15.5 % — SIGNIFICANT CHANGE UP (ref 10.3–16.9)
RBC # FLD: 15.9 % — SIGNIFICANT CHANGE UP (ref 10.3–16.9)
SODIUM SERPL-SCNC: 131 MMOL/L — LOW (ref 135–145)
SODIUM SERPL-SCNC: 131 MMOL/L — LOW (ref 135–145)
SODIUM SERPL-SCNC: 132 MMOL/L — LOW (ref 135–145)
WBC # BLD: 8 K/UL — SIGNIFICANT CHANGE UP (ref 3.8–10.5)
WBC # BLD: 8.8 K/UL — SIGNIFICANT CHANGE UP (ref 3.8–10.5)
WBC # FLD AUTO: 8 K/UL — SIGNIFICANT CHANGE UP (ref 3.8–10.5)
WBC # FLD AUTO: 8.8 K/UL — SIGNIFICANT CHANGE UP (ref 3.8–10.5)

## 2017-06-08 PROCEDURE — 36415 COLL VENOUS BLD VENIPUNCTURE: CPT

## 2017-06-08 PROCEDURE — 85610 PROTHROMBIN TIME: CPT

## 2017-06-08 PROCEDURE — 83735 ASSAY OF MAGNESIUM: CPT

## 2017-06-08 PROCEDURE — 80048 BASIC METABOLIC PNL TOTAL CA: CPT

## 2017-06-08 PROCEDURE — 87186 SC STD MICRODIL/AGAR DIL: CPT

## 2017-06-08 PROCEDURE — 86923 COMPATIBILITY TEST ELECTRIC: CPT

## 2017-06-08 PROCEDURE — 99285 EMERGENCY DEPT VISIT HI MDM: CPT | Mod: 25

## 2017-06-08 PROCEDURE — 86901 BLOOD TYPING SEROLOGIC RH(D): CPT

## 2017-06-08 PROCEDURE — 84100 ASSAY OF PHOSPHORUS: CPT

## 2017-06-08 PROCEDURE — 86850 RBC ANTIBODY SCREEN: CPT

## 2017-06-08 PROCEDURE — 85027 COMPLETE CBC AUTOMATED: CPT

## 2017-06-08 PROCEDURE — 85730 THROMBOPLASTIN TIME PARTIAL: CPT

## 2017-06-08 PROCEDURE — 83930 ASSAY OF BLOOD OSMOLALITY: CPT

## 2017-06-08 PROCEDURE — 86900 BLOOD TYPING SEROLOGIC ABO: CPT

## 2017-06-08 PROCEDURE — 87086 URINE CULTURE/COLONY COUNT: CPT

## 2017-06-08 PROCEDURE — 99239 HOSP IP/OBS DSCHRG MGMT >30: CPT

## 2017-06-08 PROCEDURE — 85025 COMPLETE CBC W/AUTO DIFF WBC: CPT

## 2017-06-08 PROCEDURE — P9016: CPT

## 2017-06-08 PROCEDURE — 36430 TRANSFUSION BLD/BLD COMPNT: CPT

## 2017-06-08 RX ORDER — SIMVASTATIN 20 MG/1
1 TABLET, FILM COATED ORAL
Qty: 0 | Refills: 0 | COMMUNITY

## 2017-06-08 RX ORDER — SODIUM HYPOCHLORITE 0.125 %
1 SOLUTION, NON-ORAL MISCELLANEOUS
Qty: 1 | Refills: 0 | OUTPATIENT
Start: 2017-06-08 | End: 2017-06-15

## 2017-06-08 RX ORDER — ASPIRIN/CALCIUM CARB/MAGNESIUM 324 MG
1 TABLET ORAL
Qty: 0 | Refills: 0 | COMMUNITY

## 2017-06-08 RX ORDER — SODIUM CHLORIDE 9 MG/ML
1000 INJECTION INTRAMUSCULAR; INTRAVENOUS; SUBCUTANEOUS
Qty: 0 | Refills: 0 | Status: DISCONTINUED | OUTPATIENT
Start: 2017-06-08 | End: 2017-06-08

## 2017-06-08 RX ORDER — FERROUS SULFATE 325(65) MG
0 TABLET ORAL
Qty: 0 | Refills: 0 | COMMUNITY

## 2017-06-08 RX ORDER — TRAMADOL HYDROCHLORIDE 50 MG/1
50 TABLET ORAL EVERY 4 HOURS
Qty: 0 | Refills: 0 | Status: DISCONTINUED | OUTPATIENT
Start: 2017-06-08 | End: 2017-06-08

## 2017-06-08 RX ORDER — METOPROLOL TARTRATE 50 MG
25 TABLET ORAL
Qty: 0 | Refills: 0 | COMMUNITY

## 2017-06-08 RX ORDER — ANASTROZOLE 1 MG/1
1 TABLET ORAL
Qty: 0 | Refills: 0 | COMMUNITY

## 2017-06-08 RX ORDER — SIMVASTATIN 20 MG/1
1 TABLET, FILM COATED ORAL
Qty: 30 | Refills: 0 | OUTPATIENT
Start: 2017-06-08 | End: 2017-07-08

## 2017-06-08 RX ADMIN — Medication 325 MILLIGRAM(S): at 14:50

## 2017-06-08 RX ADMIN — Medication 300 MILLIGRAM(S): at 05:58

## 2017-06-08 RX ADMIN — Medication 25 MILLIGRAM(S): at 05:58

## 2017-06-08 RX ADMIN — SODIUM CHLORIDE 60 MILLILITER(S): 9 INJECTION INTRAMUSCULAR; INTRAVENOUS; SUBCUTANEOUS at 00:33
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
## 2017-06-08 NOTE — PROGRESS NOTE ADULT - PROBLEM SELECTOR PROBLEM 5
Essential hypertension
Coronary artery disease without angina pectoris, unspecified vessel or lesion type, unspecified whether native or transplanted heart

## 2017-06-08 NOTE — DISCHARGE NOTE ADULT - SECONDARY DIAGNOSIS.
Malignant neoplasm of left female breast, unspecified site of breast Essential hypertension Hyponatremia

## 2017-06-08 NOTE — DISCHARGE NOTE ADULT - PATIENT PORTAL LINK FT
“You can access the [facility] Patient Portal, offered by [facility], by registering with the following website: [email or web address]”

## 2017-06-08 NOTE — PROGRESS NOTE ADULT - ASSESSMENT
Pt is a 76 yo F w/pmhx of HTN, HLD, anemia and fungating L breast ca w/recent admission for L breast bleeding who presented w/L breast bleeding found to be anemia and hyponatremic.
76 y/o F w/

## 2017-06-08 NOTE — ADVANCED PRACTICE NURSE CONSULT - RECOMMEDATIONS
Recommend 1/4 strength Dakin's solution to fungating mass daily x 7 days: Moisten gauze with solution then place over site. Leave silicone dressing in place to radiation dermatitis x 5 days (unless going for radiation), clean over silicone dressing and apply Silvadene on top of silicone bid. Cover all areas with 4x4s and ABD pads. Recommend pt continue to use Spandage MFR# MT07 to hold dressings in place instead of tape. She states she has some at home. Possible home care for assessment of sites. Pt given some dressings to take home. Spoke with ZANDRA Alvarez and house staff.

## 2017-06-08 NOTE — PROGRESS NOTE ADULT - PROBLEM SELECTOR PROBLEM 2
Malignant neoplasm of left female breast, unspecified site of breast
Malignant neoplasm of left female breast, unspecified site of breast

## 2017-06-08 NOTE — PROGRESS NOTE ADULT - PROBLEM SELECTOR PLAN 1
pt w/anemia secondary to active bleeding from malignant fungating breast mass, w/fatigue, transfused 1unit prbc in the ED. HgB stable this AM 8.4. No bleeding from breast mass overnight  - continue to monitor CBC  - active T&S pt w/anemia secondary to active bleeding from malignant fungating breast mass, w/fatigue, transfused 1unit pRBCs in the ED. HgB stable this AM 8.4. No bleeding from breast mass overnight  - continue to monitor CBC  - active T&S

## 2017-06-08 NOTE — PROGRESS NOTE ADULT - PROBLEM SELECTOR PLAN 1
d/t bleeding, fungating breast mass/wound, in setting of chronic anemia d/t neoplastic disease; since resolved; Hb improved s/p 1U PRBC; asymptomatic; monitor CBC, keep Hb > 7; cont. P.O. iron (home rx)

## 2017-06-08 NOTE — PROGRESS NOTE ADULT - PROBLEM SELECTOR PLAN 5
Pt on metoprolol 25mg once a day  - c/w metoprolol 25mg qd
cont. statin + beta-blocker; ASA held in setting of bleed

## 2017-06-08 NOTE — DISCHARGE NOTE ADULT - PROVIDER TOKENS
FREE:[LAST:[[place]],FIRST:[[place]],PHONE:[phone]:[(   [phone]    -],ADDRESS:[[address] [place] [address]]],FREE:[LAST:[[place]],FIRST:[[place]],PHONE:[phone]:[(   )    -],ADDRESS:[[address] [place] [address]]]

## 2017-06-08 NOTE — PROGRESS NOTE ADULT - PROBLEM SELECTOR PLAN 6
- DASH diet  - Replete electrolytes PRN, Mg>2, K>4
resolved; afebrile; monitor CBC off abx; cont. local wound care as above

## 2017-06-08 NOTE — DISCHARGE NOTE ADULT - HOSPITAL COURSE
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 In ED, T 98.6, HR 82, /66, RR 16, SPO2 97% on RA, Labs significant for HgB 7.8. Pt received 1 unit of pRBCs, and HgB remains stable (8.4). Pt was evaluated by Wound care team 78 yo F w PMHx of HTN, cervical cancer s/p hysterectomy in '88, fungating breast CA diagnosed in March 2017 s/p 2 treatments of chemotherapy and currently undergoing radiation therapy who presents w/ persistent bleeding from the L breast mass since her last radiation treatment two days, admitted for anemia due to acute blood loss. In ED, T 98.6, HR 82, /66, RR 16, SPO2 97% on RA, Labs significant for HgB 7.8. Pt received 1 unit of pRBCs, and HgB remains stable (8.4). Pt was evaluated by Wound care team and received instructions for wound care 76 yo F w PMHx of HTN, cervical cancer s/p hysterectomy in '88, fungating breast CA diagnosed in March 2017 s/p 2 treatments of chemotherapy and currently undergoing radiation therapy who presents w/ persistent bleeding from the L breast mass since her last radiation treatment two days, admitted for anemia due to acute blood loss. In ED, T 98.6, HR 82, /66, RR 16, SPO2 97% on RA, Labs significant for HgB 7.8. Pt received 1 unit of pRBCs, and HgB remains stable (8.4). Pt was evaluated by Wound care team and received instructions for wound care. Pt is HD stable for discharge, will follow up with her oncologists, Dr. Keys, Dr. Andrews, and Dr. Frias

## 2017-06-08 NOTE — DISCHARGE NOTE ADULT - MEDICATION SUMMARY - MEDICATIONS TO TAKE
I will START or STAY ON the medications listed below when I get home from the hospital:    traMADol 50 mg oral tablet  --  by mouth , As Needed  -- Indication: For Pain    simvastatin 20 mg oral tablet  -- 1 tab(s) by mouth once a day (at bedtime)  -- Indication: For Atherosclerotic cardiovascular disease prevention    anastrozole 1 mg oral tablet  -- 1 tab(s) by mouth once a day  -- Indication: For Malignant neoplasm of left female breast, unspecified site of breast    Dakins Quarter Strength 0.125% topical solution  -- Apply on skin to affected area once a day  -- For external use only.    -- Indication: For Malignant neoplasm of left female breast, unspecified site of breast    metoprolol  -- 25 milligram(s) by mouth once a day  -- Indication: For Essential hypertension    Silvadene 1% topical cream  -- Apply on skin to affected area once a day  -- For external use only.    -- Indication: For Malignant neoplasm of left female breast, unspecified site of breast    ferrous sulfate  --  by mouth   -- Indication: For ANEMIA

## 2017-06-08 NOTE — PROGRESS NOTE ADULT - PROBLEM SELECTOR PLAN 2
Pt follows with Dr. Keys/Dr. Andrews as well as Drew and Dr. Frias, her radiation oncologist. Fungating breast mass, however does not look infected, was started on clindamycin in setting of elevated WBCs, leukocytosis resolved.   - c/w anastrazole 1mg daily  - for moderate pain c/w home tramadol 50mg Q 4hrs w/percocet for severe pain  - discontinued clindamycin Pt follows with Dr. Keys/Dr. Andrews as well as Drew and Dr. Frias, her radiation oncologist. Fungating breast mass, however does not look infected, was started on clindamycin in setting of elevated WBCs, leukocytosis resolved.   - c/w anastrazole 1mg daily  - for moderate pain c/w home tramadol 50mg Q 4hrs w/percocet for severe pain  - discontinued clindamycin  - f/u Wound care recs

## 2017-06-08 NOTE — DISCHARGE NOTE ADULT - CARE PROVIDER_API CALL
Ramsey Keys  [address] [place] [address]  Phone: [phone]  Fax: (   )    -    Herbert Frias  [address] [place] [address]  Phone: [phone]  Fax: (   )    -

## 2017-06-08 NOTE — PROGRESS NOTE ADULT - SUBJECTIVE AND OBJECTIVE BOX
INTERVAL HPI/OVERNIGHT EVENTS:    OVERNIGHT: No overnight events.  SUBJECTIVE: Patient seen and examined at bedside.     ROS:  CV: Denies chest pain  Resp: Denies SOB  GI: Denies abdominal pain, constipation, diarrhea, nausea, vomiting  : Denies dysuria  ID: Denies fevers, chills  MSK: Denies joint pain     OBJECTIVE:    VITAL SIGNS:  ICU Vital Signs Last 24 Hrs  T(C): 36.6, Max: 37 (06-07 @ 16:54)  T(F): 97.9, Max: 98.6 (06-07 @ 16:54)  HR: 70 (70 - 90)  BP: 122/60 (110/63 - 148/73)  BP(mean): --  ABP: --  ABP(mean): --  RR: 18 (16 - 18)  SpO2: 97% (97% - 99%)        CAPILLARY BLOOD GLUCOSE      PHYSICAL EXAM:    Constitutional: WDWN resting comfortably in bed; NAD  HEENT: NC/AT, PERRLA, EOMI, clear conjunctiva, no oropharyngeal erythema or exudates  Neck: supple; no JVD or thyromegaly, no LAD  Breast: left breast fungating mass w/necrotic tissue, malodorous, skin breakdown along the breast tissue, covered with gauze  Respiratory: CTA B/L; no W/R/R, no retractions  Cardiac: +S1/S2; RRR; no M/R/G; PMI non-displaced  Gastrointestinal: soft, NT/ND; no rebound or guarding; +BSx4  Extremities: WWP, no clubbing or cyanosis; no peripheral edema  Vascular: 2+ radial, DP pulses B/L  Neurologic: AAOx3; answers questions appropriately, follows commands, moves all extremities    MEDICATIONS:  MEDICATIONS  (STANDING):  simvastatin 20milliGRAM(s) Oral at bedtime  anastrozole 1milliGRAM(s) Oral daily  metoprolol succinate ER 25milliGRAM(s) Oral daily  ferrous    sulfate 325milliGRAM(s) Oral daily    MEDICATIONS  (PRN):  traMADol 50milliGRAM(s) Oral every 4 hours PRN Moderate Pain (4 - 6)  oxyCODONE  5 mG/acetaminophen 325 mG 1Tablet(s) Oral every 4 hours PRN Severe Pain (7 - 10)      ALLERGIES:  Allergies    No Known Allergies    Intolerances        LABS:                        8.4    8.8   )-----------( 346      ( 08 Jun 2017 11:26 )             27.0     06-08    131<L>  |  96  |  19  ----------------------------<  155<H>  4.1   |  21<L>  |  0.70    Ca    8.5      08 Jun 2017 11:26  Phos  4.2     06-08  Mg     1.9     06-08      PT/INR - ( 07 Jun 2017 19:08 )   PT: 14.1 sec;   INR: 1.26          PTT - ( 07 Jun 2017 19:08 )  PTT:22.4 sec      RADIOLOGY & ADDITIONAL TESTS: Reviewed. INTERVAL HPI/OVERNIGHT EVENTS: Pt received 1 unit of pRBCs in ED. HgB stable      SUBJECTIVE: Patient seen and examined at bedside, states that breast mass has stopped bleeding overnight, denies headache, dizziness, chest pain, SOB, abdominal pain, n/v/d/c, dysuria, fever, chills/rigors        OBJECTIVE:    VITAL SIGNS:  ICU Vital Signs Last 24 Hrs  T(C): 36.6, Max: 37 (06-07 @ 16:54)  T(F): 97.9, Max: 98.6 (06-07 @ 16:54)  HR: 70 (70 - 90)  BP: 122/60 (110/63 - 148/73)  BP(mean): --  ABP: --  ABP(mean): --  RR: 18 (16 - 18)  SpO2: 97% (97% - 99%)        CAPILLARY BLOOD GLUCOSE      PHYSICAL EXAM:    Constitutional: WDWN resting comfortably in bed; NAD  HEENT: NC/AT, PERRLA, EOMI, clear conjunctiva, no oropharyngeal erythema or exudates  Neck: supple; no JVD or thyromegaly, no LAD  Breast: left breast fungating mass w/necrotic tissue, malodorous, skin breakdown along the breast tissue, covered with gauze  Respiratory: CTA B/L; no W/R/R, no retractions  Cardiac: +S1/S2; RRR; no M/R/G; PMI non-displaced  Gastrointestinal: soft, NT/ND; no rebound or guarding; +BSx4  Extremities: WWP, no clubbing or cyanosis; no peripheral edema  Vascular: 2+ radial, DP pulses B/L  Neurologic: AAOx3; answers questions appropriately, follows commands, moves all extremities    MEDICATIONS:  MEDICATIONS  (STANDING):  simvastatin 20milliGRAM(s) Oral at bedtime  anastrozole 1milliGRAM(s) Oral daily  metoprolol succinate ER 25milliGRAM(s) Oral daily  ferrous    sulfate 325milliGRAM(s) Oral daily    MEDICATIONS  (PRN):  traMADol 50milliGRAM(s) Oral every 4 hours PRN Moderate Pain (4 - 6)  oxyCODONE  5 mG/acetaminophen 325 mG 1Tablet(s) Oral every 4 hours PRN Severe Pain (7 - 10)      ALLERGIES:  Allergies    No Known Allergies    Intolerances        LABS:                        8.4    8.8   )-----------( 346      ( 08 Jun 2017 11:26 )             27.0     06-08    131<L>  |  96  |  19  ----------------------------<  155<H>  4.1   |  21<L>  |  0.70    Ca    8.5      08 Jun 2017 11:26  Phos  4.2     06-08  Mg     1.9     06-08      PT/INR - ( 07 Jun 2017 19:08 )   PT: 14.1 sec;   INR: 1.26          PTT - ( 07 Jun 2017 19:08 )  PTT:22.4 sec      RADIOLOGY & ADDITIONAL TESTS: Reviewed.

## 2017-06-08 NOTE — PROGRESS NOTE ADULT - PROBLEM SELECTOR PLAN 4
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    - check serum osm, urine osm, urine Na

## 2017-06-08 NOTE — PROGRESS NOTE ADULT - PROBLEM SELECTOR PLAN 4
asymptomatic; euvolemic; likely SIADH d/t pain and/or cancer; cont. pain control as above; monitor BMP, may need to water restrict if worsening

## 2017-06-08 NOTE — DISCHARGE NOTE ADULT - CARE PLAN
Principal Discharge DX:	Anemia due to acute blood loss  Instructions for follow-up, activity and diet:	You were admitted due to anemia because you had bleeding from your left breast mass. You received 1 unit of blood in the hospital. Your red blood cell count and hemoglobin level is stable.  Secondary Diagnosis:	Malignant neoplasm of left female breast, unspecified site of breast  Instructions for follow-up, activity and diet:	You had bleeding from your left breast mass. Please   Please continue anastrazole 1mg daily and follow up with your oncologist and your radiation oncologist.  Secondary Diagnosis:	Essential hypertension  Instructions for follow-up, activity and diet:	Please continue metoprolol 25mg daily for your blood pressure Principal Discharge DX:	Anemia due to acute blood loss  Goal:	resolved  Instructions for follow-up, activity and diet:	You were admitted due to anemia because you had bleeding from your left breast mass. You received 1 unit of blood in the hospital. Your red blood cell count and hemoglobin level is stable.  Secondary Diagnosis:	Malignant neoplasm of left female breast, unspecified site of breast  Instructions for follow-up, activity and diet:	You had bleeding from your left breast mass. Please apply 1/4 strength Dakin's solution to fungating mass daily x 7 days: Moisten gauze with solution then place over site. Leave silicone dressing in place to radiation dermatitis x 5 days (unless going for radiation), clean over silicone dressing and apply Silvadene on top of silicone twice a day.   Please continue anastrazole 1mg daily and follow up with your oncologist and your radiation oncologist.  Secondary Diagnosis:	Essential hypertension  Instructions for follow-up, activity and diet:	Please continue metoprolol 25mg daily for your blood pressure Principal Discharge DX:	Anemia due to acute blood loss  Goal:	resolved  Instructions for follow-up, activity and diet:	You were admitted due to anemia because you had bleeding from your left breast mass. You received 1 unit of blood in the hospital. Your red blood cell count and hemoglobin level is stable.  Secondary Diagnosis:	Malignant neoplasm of left female breast, unspecified site of breast  Instructions for follow-up, activity and diet:	You had bleeding from your left breast mass. Please apply 1/4 strength Dakin's solution to fungating mass daily x 7 days: Moisten gauze with solution then place over site. Leave silicone dressing in place to radiation dermatitis x 5 days (unless going for radiation), clean over silicone dressing and apply Silvadene on top of silicone twice a day.   Please continue anastrazole 1mg daily and follow up with your oncologist and your radiation oncologist.  Secondary Diagnosis:	Essential hypertension  Instructions for follow-up, activity and diet:	Please continue metoprolol 25mg daily for your blood pressure  Secondary Diagnosis:	Hyponatremia  Instructions for follow-up, activity and diet:	you have mildly low Na, your level is stable during hospital stay.

## 2017-06-08 NOTE — DISCHARGE NOTE ADULT - PLAN OF CARE
You were admitted due to anemia because you had bleeding from your left breast mass. You received 1 unit of blood in the hospital. Your red blood cell count and hemoglobin level is stable. You had bleeding from your left breast mass. Please   Please continue anastrazole 1mg daily and follow up with your oncologist and your radiation oncologist. Please continue metoprolol 25mg daily for your blood pressure resolved You had bleeding from your left breast mass. Please apply 1/4 strength Dakin's solution to fungating mass daily x 7 days: Moisten gauze with solution then place over site. Leave silicone dressing in place to radiation dermatitis x 5 days (unless going for radiation), clean over silicone dressing and apply Silvadene on top of silicone twice a day.   Please continue anastrazole 1mg daily and follow up with your oncologist and your radiation oncologist. you have mildly low Na, your level is stable during hospital stay.

## 2017-06-08 NOTE — DISCHARGE NOTE ADULT - NS AS ACTIVITY OBS
Stairs allowed/Bathing allowed/Walking-Indoors allowed/Showering allowed/Return to Work/School allowed/Walking-Outdoors allowed

## 2017-06-08 NOTE — PROGRESS NOTE ADULT - SUBJECTIVE AND OBJECTIVE BOX
Patient is a 77y old  Female who presents with a chief complaint of anemia w/bleeding from fungating breast mass; hyponatremia (07 Jun 2017 23:43)      INTERVAL HPI/OVERNIGHT EVENTS:    Pt. seen and examined in the ER at 10:30AM  Pt. feels well, breast wound stopped bleeding last night, per Pt.  No F/C, CP, SOB, lightheadedness, dizziness, fatigue, HA    Review of Systems: 12 point review of systems otherwise negative    MEDICATIONS  (STANDING):  simvastatin 20milliGRAM(s) Oral at bedtime  anastrozole 1milliGRAM(s) Oral daily  metoprolol succinate ER 25milliGRAM(s) Oral daily  ferrous    sulfate 325milliGRAM(s) Oral daily    MEDICATIONS  (PRN):  traMADol 50milliGRAM(s) Oral every 4 hours PRN Moderate Pain (4 - 6)  oxyCODONE  5 mG/acetaminophen 325 mG 1Tablet(s) Oral every 4 hours PRN Severe Pain (7 - 10)      Allergies    No Known Allergies    Intolerances          Vital Signs Last 24 Hrs  T(C): 36.6, Max: 37 (06-07 @ 16:54)  T(F): 97.9, Max: 98.6 (06-07 @ 16:54)  HR: 70 (70 - 90)  BP: 122/60 (110/63 - 148/73)  BP(mean): --  RR: 18 (16 - 18)  SpO2: 97% (97% - 99%)  CAPILLARY BLOOD GLUCOSE        Physical Exam:  (at 10:30AM)  Daily Height in cm: 160.02 (07 Jun 2017 16:54)    Daily   General:  non-toxic and comfortable-appearing in NAD  HEENT: MMM  Breast: see housestaff note  CV:  no JVD  Extremities:  no edema B/L LE  Skin:  WWP  Neuro:  AAOx3, non-focal    LABS:                        8.4    8.8   )-----------( 346      ( 08 Jun 2017 11:26 )             27.0     06-08    131<L>  |  96  |  19  ----------------------------<  155<H>  4.1   |  21<L>  |  0.70    Ca    8.5      08 Jun 2017 11:26  Phos  4.2     06-08  Mg     1.9     06-08      PT/INR - ( 07 Jun 2017 19:08 )   PT: 14.1 sec;   INR: 1.26          PTT - ( 07 Jun 2017 19:08 )  PTT:22.4 sec        RADIOLOGY & ADDITIONAL TESTS:    ---------------------------------------------------------------------------  I personally reviewed: [  ]EKG   [  ]CXR    [  ] CT    [  ]Other  ---------------------------------------------------------------------------  PLEASE CHECK WHEN PRESENT:     [  ]Heart Failure     [  ] Acute     [  ] Acute on Chronic     [  ] Chronic  -------------------------------------------------------------------     [  ]Diastolic [HFpEF]     [  ]Systolic [HFrEF]     [  ]Combined [HFpEF & HFrEF]     [  ]Other:  -------------------------------------------------------------------  [  ]MAGDA     [  ]ATN     [  ]Reneal Medullary Necrosis     [  ]Renal Cortical Necrosis     [  ]Other Pathological Lesions:    [  ]CKD 1  [  ]CKD 2  [  ]CKD 3  [  ]CKD 4  [  ]CKD 5  [  ]Other  -------------------------------------------------------------------  [  ]Other/Unspecified:    --------------------------------------------------------------------    Abdominal Nutritional Status  [  ]Malnutrition: See Nutrition Note  [  ]Cachexia  [  ]Other:   [  ]Supplement Ordered:  [  ]Morbid Obesity (BMI >=40]

## 2017-06-12 LAB
-  AMIKACIN: SIGNIFICANT CHANGE UP
-  AMPICILLIN/SULBACTAM: SIGNIFICANT CHANGE UP
-  AMPICILLIN: SIGNIFICANT CHANGE UP
-  CEFAZOLIN: SIGNIFICANT CHANGE UP
-  CEFTRIAXONE: SIGNIFICANT CHANGE UP
-  CIPROFLOXACIN: SIGNIFICANT CHANGE UP
-  GENTAMICIN: SIGNIFICANT CHANGE UP
-  IMIPENEM: SIGNIFICANT CHANGE UP
-  NITROFURANTOIN: SIGNIFICANT CHANGE UP
-  PIPERACILLIN/TAZOBACTAM: SIGNIFICANT CHANGE UP
-  TOBRAMYCIN: SIGNIFICANT CHANGE UP
-  TRIMETHOPRIM/SULFAMETHOXAZOLE: SIGNIFICANT CHANGE UP
CULTURE RESULTS: SIGNIFICANT CHANGE UP
METHOD TYPE: SIGNIFICANT CHANGE UP
SPECIMEN SOURCE: SIGNIFICANT CHANGE UP

## 2017-06-13 DIAGNOSIS — I25.10 ATHEROSCLEROTIC HEART DISEASE OF NATIVE CORONARY ARTERY WITHOUT ANGINA PECTORIS: ICD-10-CM

## 2017-06-13 DIAGNOSIS — D64.9 ANEMIA, UNSPECIFIED: ICD-10-CM

## 2017-06-13 DIAGNOSIS — C79.81 SECONDARY MALIGNANT NEOPLASM OF BREAST: ICD-10-CM

## 2017-06-13 DIAGNOSIS — L59.8 OTHER SPECIFIED DISORDERS OF THE SKIN AND SUBCUTANEOUS TISSUE RELATED TO RADIATION: ICD-10-CM

## 2017-06-13 DIAGNOSIS — Z85.3 PERSONAL HISTORY OF MALIGNANT NEOPLASM OF BREAST: ICD-10-CM

## 2017-06-13 DIAGNOSIS — I97.131 POSTPROCEDURAL HEART FAILURE FOLLOWING OTHER SURGERY: ICD-10-CM

## 2017-06-13 DIAGNOSIS — E87.1 HYPO-OSMOLALITY AND HYPONATREMIA: ICD-10-CM

## 2017-06-13 DIAGNOSIS — E78.5 HYPERLIPIDEMIA, UNSPECIFIED: ICD-10-CM

## 2017-06-13 DIAGNOSIS — I10 ESSENTIAL (PRIMARY) HYPERTENSION: ICD-10-CM

## 2017-06-13 DIAGNOSIS — Z79.82 LONG TERM (CURRENT) USE OF ASPIRIN: ICD-10-CM

## 2017-06-13 DIAGNOSIS — D62 ACUTE POSTHEMORRHAGIC ANEMIA: ICD-10-CM

## 2017-06-13 DIAGNOSIS — G89.3 NEOPLASM RELATED PAIN (ACUTE) (CHRONIC): ICD-10-CM

## 2017-06-13 LAB
ORGANISM # SPEC MICROSCOPIC CNT: SIGNIFICANT CHANGE UP
ORGANISM # SPEC MICROSCOPIC CNT: SIGNIFICANT CHANGE UP

## 2017-06-14 DIAGNOSIS — C50.912 MALIGNANT NEOPLASM OF UNSPECIFIED SITE OF LEFT FEMALE BREAST: ICD-10-CM
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
## 2017-06-14 DIAGNOSIS — D72.829 ELEVATED WHITE BLOOD CELL COUNT, UNSPECIFIED: ICD-10-CM

## 2017-09-22 ENCOUNTER — APPOINTMENT (OUTPATIENT)
Dept: BREAST CENTER | Facility: CLINIC | Age: 77
End: 2017-09-22
Payer: MEDICARE

## 2017-09-22 VITALS
HEART RATE: 101 BPM | WEIGHT: 160 LBS | DIASTOLIC BLOOD PRESSURE: 88 MMHG | BODY MASS INDEX: 29.44 KG/M2 | SYSTOLIC BLOOD PRESSURE: 120 MMHG | HEIGHT: 62 IN

## 2017-09-22 DIAGNOSIS — C50.912 MALIGNANT NEOPLASM OF UNSPECIFIED SITE OF LEFT FEMALE BREAST: ICD-10-CM

## 2017-09-22 PROCEDURE — 99214 OFFICE O/P EST MOD 30 MIN: CPT

## 2017-09-22 RX ORDER — METRONIDAZOLE 500 MG/1
500 TABLET ORAL
Qty: 30 | Refills: 5 | Status: ACTIVE | COMMUNITY
Start: 2017-09-22 | End: 1900-01-01

## 2017-09-22 RX ORDER — SODIUM HYPOCHLORITE 1.25 MG/ML
0.12 SOLUTION TOPICAL
Qty: 1 | Refills: 5 | Status: ACTIVE | COMMUNITY
Start: 2017-09-22 | End: 1900-01-01

## 2017-11-01 ENCOUNTER — EMERGENCY (EMERGENCY)
Facility: HOSPITAL | Age: 77
LOS: 1 days | Discharge: HOME | End: 2017-11-01
Attending: EMERGENCY MEDICINE | Admitting: EMERGENCY MEDICINE
Payer: COMMERCIAL

## 2017-11-01 VITALS
DIASTOLIC BLOOD PRESSURE: 66 MMHG | TEMPERATURE: 98 F | HEART RATE: 100 BPM | WEIGHT: 119.93 LBS | RESPIRATION RATE: 20 BRPM | OXYGEN SATURATION: 97 % | SYSTOLIC BLOOD PRESSURE: 104 MMHG | HEIGHT: 61 IN

## 2017-11-01 VITALS
SYSTOLIC BLOOD PRESSURE: 144 MMHG | RESPIRATION RATE: 16 BRPM | HEART RATE: 79 BPM | DIASTOLIC BLOOD PRESSURE: 82 MMHG | OXYGEN SATURATION: 94 %

## 2017-11-01 DIAGNOSIS — Z90.710 ACQUIRED ABSENCE OF BOTH CERVIX AND UTERUS: Chronic | ICD-10-CM

## 2017-11-01 DIAGNOSIS — Z90.49 ACQUIRED ABSENCE OF OTHER SPECIFIED PARTS OF DIGESTIVE TRACT: Chronic | ICD-10-CM

## 2017-11-01 LAB
ANION GAP SERPL CALC-SCNC: 16 MMOL/L — SIGNIFICANT CHANGE UP (ref 5–17)
APTT BLD: 28 SEC — SIGNIFICANT CHANGE UP (ref 27.5–37.4)
BASOPHILS NFR BLD AUTO: 0.1 % — SIGNIFICANT CHANGE UP (ref 0–2)
BUN SERPL-MCNC: 16 MG/DL — SIGNIFICANT CHANGE UP (ref 7–23)
CALCIUM SERPL-MCNC: 10.4 MG/DL — SIGNIFICANT CHANGE UP (ref 8.4–10.5)
CHLORIDE SERPL-SCNC: 91 MMOL/L — LOW (ref 96–108)
CK MB CFR SERPL CALC: 1.5 NG/ML — SIGNIFICANT CHANGE UP (ref 0–6.7)
CK SERPL-CCNC: 85 U/L — SIGNIFICANT CHANGE UP (ref 25–170)
CO2 SERPL-SCNC: 24 MMOL/L — SIGNIFICANT CHANGE UP (ref 22–31)
CREAT SERPL-MCNC: 0.78 MG/DL — SIGNIFICANT CHANGE UP (ref 0.5–1.3)
EOSINOPHIL NFR BLD AUTO: 0 % — SIGNIFICANT CHANGE UP (ref 0–6)
GLUCOSE SERPL-MCNC: 140 MG/DL — HIGH (ref 70–99)
HCT VFR BLD CALC: 34.8 % — SIGNIFICANT CHANGE UP (ref 34.5–45)
HGB BLD-MCNC: 11.5 G/DL — SIGNIFICANT CHANGE UP (ref 11.5–15.5)
INR BLD: 1.18 — HIGH (ref 0.88–1.16)
LYMPHOCYTES # BLD AUTO: 4.7 % — LOW (ref 13–44)
MCHC RBC-ENTMCNC: 25.2 PG — LOW (ref 27–34)
MCHC RBC-ENTMCNC: 33 G/DL — SIGNIFICANT CHANGE UP (ref 32–36)
MCV RBC AUTO: 76.3 FL — LOW (ref 80–100)
MONOCYTES NFR BLD AUTO: 6.9 % — SIGNIFICANT CHANGE UP (ref 2–14)
NEUTROPHILS NFR BLD AUTO: 88.3 % — HIGH (ref 43–77)
PLATELET # BLD AUTO: 242 K/UL — SIGNIFICANT CHANGE UP (ref 150–400)
POTASSIUM SERPL-MCNC: 4 MMOL/L — SIGNIFICANT CHANGE UP (ref 3.5–5.3)
POTASSIUM SERPL-SCNC: 4 MMOL/L — SIGNIFICANT CHANGE UP (ref 3.5–5.3)
PROTHROM AB SERPL-ACNC: 13.1 SEC — HIGH (ref 9.8–12.7)
RBC # BLD: 4.56 M/UL — SIGNIFICANT CHANGE UP (ref 3.8–5.2)
RBC # FLD: 15.6 % — SIGNIFICANT CHANGE UP (ref 10.3–16.9)
SODIUM SERPL-SCNC: 131 MMOL/L — LOW (ref 135–145)
TROPONIN T SERPL-MCNC: <0.01 NG/ML — SIGNIFICANT CHANGE UP (ref 0–0.01)
WBC # BLD: 10 K/UL — SIGNIFICANT CHANGE UP (ref 3.8–10.5)
WBC # FLD AUTO: 10 K/UL — SIGNIFICANT CHANGE UP (ref 3.8–10.5)

## 2017-11-01 PROCEDURE — 80048 BASIC METABOLIC PNL TOTAL CA: CPT

## 2017-11-01 PROCEDURE — 84484 ASSAY OF TROPONIN QUANT: CPT

## 2017-11-01 PROCEDURE — 85610 PROTHROMBIN TIME: CPT

## 2017-11-01 PROCEDURE — 71275 CT ANGIOGRAPHY CHEST: CPT

## 2017-11-01 PROCEDURE — 71020: CPT | Mod: 26

## 2017-11-01 PROCEDURE — 93010 ELECTROCARDIOGRAM REPORT: CPT

## 2017-11-01 PROCEDURE — 99284 EMERGENCY DEPT VISIT MOD MDM: CPT | Mod: 25

## 2017-11-01 PROCEDURE — 99285 EMERGENCY DEPT VISIT HI MDM: CPT | Mod: 25

## 2017-11-01 PROCEDURE — 93005 ELECTROCARDIOGRAM TRACING: CPT

## 2017-11-01 PROCEDURE — 71275 CT ANGIOGRAPHY CHEST: CPT | Mod: 26

## 2017-11-01 PROCEDURE — 82553 CREATINE MB FRACTION: CPT

## 2017-11-01 PROCEDURE — 85025 COMPLETE CBC W/AUTO DIFF WBC: CPT

## 2017-11-01 PROCEDURE — 96374 THER/PROPH/DIAG INJ IV PUSH: CPT | Mod: XU

## 2017-11-01 PROCEDURE — 71046 X-RAY EXAM CHEST 2 VIEWS: CPT

## 2017-11-01 PROCEDURE — 96375 TX/PRO/DX INJ NEW DRUG ADDON: CPT | Mod: XU

## 2017-11-01 PROCEDURE — 82550 ASSAY OF CK (CPK): CPT

## 2017-11-01 PROCEDURE — 85730 THROMBOPLASTIN TIME PARTIAL: CPT

## 2017-11-01 RX ORDER — HYDROMORPHONE HYDROCHLORIDE 2 MG/ML
3 INJECTION INTRAMUSCULAR; INTRAVENOUS; SUBCUTANEOUS
Qty: 48 | Refills: 0 | OUTPATIENT
Start: 2017-11-01 | End: 2017-11-05

## 2017-11-01 RX ORDER — HYDROMORPHONE HYDROCHLORIDE 2 MG/ML
1 INJECTION INTRAMUSCULAR; INTRAVENOUS; SUBCUTANEOUS ONCE
Qty: 0 | Refills: 0 | Status: DISCONTINUED | OUTPATIENT
Start: 2017-11-01 | End: 2017-11-01

## 2017-11-01 RX ORDER — SODIUM CHLORIDE 9 MG/ML
1000 INJECTION INTRAMUSCULAR; INTRAVENOUS; SUBCUTANEOUS
Qty: 0 | Refills: 0 | Status: DISCONTINUED | OUTPATIENT
Start: 2017-11-01 | End: 2017-11-11

## 2017-11-01 RX ORDER — METOPROLOL TARTRATE 50 MG
25 TABLET ORAL ONCE
Qty: 0 | Refills: 0 | Status: COMPLETED | OUTPATIENT
Start: 2017-11-01 | End: 2017-11-01

## 2017-11-01 RX ORDER — DEXAMETHASONE 0.5 MG/5ML
4 ELIXIR ORAL ONCE
Qty: 0 | Refills: 0 | Status: COMPLETED | OUTPATIENT
Start: 2017-11-01 | End: 2017-11-01

## 2017-11-01 RX ORDER — MORPHINE SULFATE 50 MG/1
4 CAPSULE, EXTENDED RELEASE ORAL ONCE
Qty: 0 | Refills: 0 | Status: DISCONTINUED | OUTPATIENT
Start: 2017-11-01 | End: 2017-11-01

## 2017-11-01 RX ORDER — HYDROMORPHONE HYDROCHLORIDE 2 MG/ML
6 INJECTION INTRAMUSCULAR; INTRAVENOUS; SUBCUTANEOUS ONCE
Qty: 0 | Refills: 0 | Status: DISCONTINUED | OUTPATIENT
Start: 2017-11-01 | End: 2017-11-01

## 2017-11-01 RX ADMIN — MORPHINE SULFATE 4 MILLIGRAM(S): 50 CAPSULE, EXTENDED RELEASE ORAL at 11:50

## 2017-11-01 RX ADMIN — HYDROMORPHONE HYDROCHLORIDE 6 MILLIGRAM(S): 2 INJECTION INTRAMUSCULAR; INTRAVENOUS; SUBCUTANEOUS at 18:11

## 2017-11-01 RX ADMIN — MORPHINE SULFATE 4 MILLIGRAM(S): 50 CAPSULE, EXTENDED RELEASE ORAL at 11:30

## 2017-11-01 RX ADMIN — Medication 25 MILLIGRAM(S): at 11:39

## 2017-11-01 RX ADMIN — HYDROMORPHONE HYDROCHLORIDE 1 MILLIGRAM(S): 2 INJECTION INTRAMUSCULAR; INTRAVENOUS; SUBCUTANEOUS at 13:00

## 2017-11-01 RX ADMIN — HYDROMORPHONE HYDROCHLORIDE 1 MILLIGRAM(S): 2 INJECTION INTRAMUSCULAR; INTRAVENOUS; SUBCUTANEOUS at 12:35

## 2017-11-01 RX ADMIN — Medication 4 MILLIGRAM(S): at 18:10

## 2017-11-01 RX ADMIN — SODIUM CHLORIDE 125 MILLILITER(S): 9 INJECTION INTRAMUSCULAR; INTRAVENOUS; SUBCUTANEOUS at 11:51

## 2017-11-01 NOTE — ED PROVIDER NOTE - MEDICAL DECISION MAKING DETAILS
Pt w h/o cad, breast ca c/o acute onset severe constant sscp since last pm.  HPI atypical for acs, ekg unchanged from prev - plan cardiac enzymes but low suspicion.  EKG w/o pericarditis changes either.  ? pe.  No pna sx.  ? complication from port.  ? sternal mets.  Plan labs, pain meds, bp control (no meds last pm and elevated today, no radiation of pain to suggest dissection despite high bp), cta chest, reassess.

## 2017-11-01 NOTE — ED PROVIDER NOTE - OBJECTIVE STATEMENT
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  No h/o similar.  Slightly worse w deep inspiration, no radiation, no associated palpitations, sob, cough, fever, abd pain, gerd, le pain/swelling.  No sig relief w tramadol 50 mg last pm.  Pt did not take her bp meds last pm (metoprolol 25 mg).  Pain 8/10.  Pressure like.

## 2017-11-01 NOTE — ED PROVIDER NOTE - SKIN, MLM
Skin normal color for race, warm, dry. No evidence of rash.  foul smelling wound L breast crease w/o erythema, no dainage but dressing w dried serosanguinous drainage

## 2017-11-01 NOTE — ED PROVIDER NOTE - PROGRESS NOTE DETAILS
Trop neg, ct w/o pe but + worsening lung mets, lad, spine lesions and new lesion sternum which is likely the source of pt's pain. Pt feeling much better after dilaudid.  Pt discussed w her oncologist, Dr Keys, who states pt has not seen him recently and agrees w plan for palliative care consult for assistance w pain mgmt.  He can see pt tomorrow or Mon if pt is discharged and wants to continue care w him.  Palliative care and pt's rad onc paged to discuss options/recommendations. Pt discussed w Dr Frias who can be involved w pt for palliative xrt either inpt or outpt.  If dc, he can see pt tomorrow to begin treatment plans.  Await palliative care. Pt discussed w palliative care who recommend dilaudid 6 mg q 6h prn and decadron 1 dose in ed.  Will give trial dose in ed and plan for dc w po pain meds. Pt tolerated pain meds po.  Referrals coordinator gave pt list of pain md's.

## 2017-11-01 NOTE — ED ADULT NURSE NOTE - CHPI ED SYMPTOMS NEG
no diaphoresis/no back pain/no fever/no dizziness/no chills/no syncope/no cough/no vomiting/no nausea

## 2017-11-01 NOTE — ED PROVIDER NOTE - CARDIAC, MLM
Normal rate, regular rhythm.  Heart sounds S1, S2.  No murmurs, rubs or gallops. no cw ttp, port R chest

## 2017-11-01 NOTE — ED PROVIDER NOTE - MUSCULOSKELETAL, MLM
Spine appears normal, range of motion is not limited, no muscle or joint tenderness, no c/c/e/ttp bilat le

## 2017-11-01 NOTE — ED ADULT NURSE NOTE - OBJECTIVE STATEMENT
Hx stage 4 breast Ca mets to spine/lungs, here c/o mid-sternal cp with SOB since last night. Denies palpitationsdizziness/nausea/diaphoresis. Denies cough/fever/chills. Pain is reproducible to palpation and with deep breathing and movement.

## 2017-11-01 NOTE — ED ADULT TRIAGE NOTE - OTHER COMPLAINTS
Pt states she has breast CA with mets and that her chest pain is mid sternal, non radiating and very painful. Pt noted to have wound under left breast due to radiation with foul smell. Pt states she has a right chest port and that last chemo was about 6 week ago.

## 2017-11-10 DIAGNOSIS — I25.10 ATHEROSCLEROTIC HEART DISEASE OF NATIVE CORONARY ARTERY WITHOUT ANGINA PECTORIS: ICD-10-CM

## 2017-11-10 DIAGNOSIS — I10 ESSENTIAL (PRIMARY) HYPERTENSION: ICD-10-CM

## 2017-11-10 DIAGNOSIS — C50.919 MALIGNANT NEOPLASM OF UNSPECIFIED SITE OF UNSPECIFIED FEMALE BREAST: ICD-10-CM

## 2017-11-10 DIAGNOSIS — Z79.899 OTHER LONG TERM (CURRENT) DRUG THERAPY: ICD-10-CM

## 2017-11-10 DIAGNOSIS — R07.2 PRECORDIAL PAIN: ICD-10-CM

## 2018-12-08 NOTE — ED ADULT NURSE NOTE - BP NONINVASIVE SYSTOLIC (MM HG)
Problem: Patient Care Overview  Goal: Plan of Care/Patient Progress Review  Outcome: No Change  VSS Pt using toradol and tylenol for pain control. Denies the need for narcotic pain control. Brie colbert'modesto at 1130, up to the bathroom to void and shower. Hemoglobin today 13.4. Breastfeeding baby on demand, latching well. Will continue to monitor.       191

## 2019-05-02 NOTE — H&P ADULT - PMH
Pt's  called stating the [facility] office will need a copy of the X-ray from yesterday and anything with her cardiac condition.   [facility] Fax # [phone], Phone #[phone]  Pt Phone #[phone]  Thanks Breast CA    CAD (coronary artery disease)    History of cervical cancer    HTN (hypertension)

## 2020-07-29 NOTE — ED ADULT NURSE NOTE - CAS EDN DISCHARGE ASSESSMENT
From: Gopal Dejesus  To: Janusz Mc DO  Sent: 6/25/2020 12:11 PM CDT  Subject: Referral Request    Blanca    HELP!!!    Did you get my message from yesterday needing a different neuroligist referral from Dr. Mc??  I'm guessing you did not because you are so go at getting back.  My message said Dr. Osman has not been in the office for 4 months and they have no idea when he will be back.    Gopal  
Responded via MyChart messaging. See Encounter Tab for further information.    
Alert and oriented to person, place and time
gradual onset

## 2023-08-14 NOTE — DISCHARGE NOTE ADULT - MEDICATION SUMMARY - MEDICATIONS TO TAKE
6 months follow up
I will START or STAY ON the medications listed below when I get home from the hospital:    aspirin 81 mg oral tablet  -- 1 tab(s) by mouth once a day  -- Indication: For CAD (coronary artery disease)    traMADol 50 mg oral tablet  --  by mouth , As Needed  -- Indication: For Back pain due to metastatic cancer    simvastatin 20 mg oral tablet  -- 1 tab(s) by mouth once a day (at bedtime)  -- Indication: For CAD (coronary artery disease)    anastrozole 1 mg oral tablet  -- 1 tab(s) by mouth once a day  -- Indication: For Breast CA    metoprolol  -- 25 milligram(s) by mouth once a day  -- Indication: For CAD (coronary artery disease)    ferrous sulfate  --  by mouth   -- Indication: For Anemia, unspecified type    Augmentin 875 mg-125 mg oral tablet  -- 1 tab(s) by mouth every 12 hours  -- Indication: For History of cervical cancer

## 2024-03-04 NOTE — ED PROVIDER NOTE - HEAD, MLM
How Many Skin Cancers Have You Had?: more than one What Is The Reason For Today's Visit?: History of Non-Melanoma Skin Cancer When Was Your Last Cancer Diagnosed?: 2022 Head is atraumatic. Head shape is symmetrical.
